# Patient Record
Sex: MALE | Race: WHITE | NOT HISPANIC OR LATINO
[De-identification: names, ages, dates, MRNs, and addresses within clinical notes are randomized per-mention and may not be internally consistent; named-entity substitution may affect disease eponyms.]

---

## 2017-01-12 ENCOUNTER — RECORD ABSTRACTING (OUTPATIENT)
Age: 67
End: 2017-01-12

## 2017-08-13 ENCOUNTER — INPATIENT (INPATIENT)
Facility: HOSPITAL | Age: 67
LOS: 9 days | Discharge: HOME | End: 2017-08-23
Attending: INTERNAL MEDICINE

## 2017-08-13 DIAGNOSIS — R07.9 CHEST PAIN, UNSPECIFIED: ICD-10-CM

## 2017-08-13 DIAGNOSIS — I21.4 NON-ST ELEVATION (NSTEMI) MYOCARDIAL INFARCTION: ICD-10-CM

## 2017-08-25 DIAGNOSIS — N40.0 BENIGN PROSTATIC HYPERPLASIA WITHOUT LOWER URINARY TRACT SYMPTOMS: ICD-10-CM

## 2017-08-25 DIAGNOSIS — N17.9 ACUTE KIDNEY FAILURE, UNSPECIFIED: ICD-10-CM

## 2017-08-25 DIAGNOSIS — I12.9 HYPERTENSIVE CHRONIC KIDNEY DISEASE WITH STAGE 1 THROUGH STAGE 4 CHRONIC KIDNEY DISEASE, OR UNSPECIFIED CHRONIC KIDNEY DISEASE: ICD-10-CM

## 2017-08-25 DIAGNOSIS — I21.4 NON-ST ELEVATION (NSTEMI) MYOCARDIAL INFARCTION: ICD-10-CM

## 2017-08-25 DIAGNOSIS — R50.9 FEVER, UNSPECIFIED: ICD-10-CM

## 2017-08-25 DIAGNOSIS — N18.3 CHRONIC KIDNEY DISEASE, STAGE 3 (MODERATE): ICD-10-CM

## 2017-08-25 DIAGNOSIS — I30.9 ACUTE PERICARDITIS, UNSPECIFIED: ICD-10-CM

## 2017-08-25 DIAGNOSIS — Z79.4 LONG TERM (CURRENT) USE OF INSULIN: ICD-10-CM

## 2017-08-25 DIAGNOSIS — E11.65 TYPE 2 DIABETES MELLITUS WITH HYPERGLYCEMIA: ICD-10-CM

## 2017-08-25 DIAGNOSIS — I21.3 ST ELEVATION (STEMI) MYOCARDIAL INFARCTION OF UNSPECIFIED SITE: ICD-10-CM

## 2017-08-25 DIAGNOSIS — I25.10 ATHEROSCLEROTIC HEART DISEASE OF NATIVE CORONARY ARTERY WITHOUT ANGINA PECTORIS: ICD-10-CM

## 2017-08-25 DIAGNOSIS — E83.42 HYPOMAGNESEMIA: ICD-10-CM

## 2017-08-25 DIAGNOSIS — J90 PLEURAL EFFUSION, NOT ELSEWHERE CLASSIFIED: ICD-10-CM

## 2017-08-25 DIAGNOSIS — E78.00 PURE HYPERCHOLESTEROLEMIA, UNSPECIFIED: ICD-10-CM

## 2017-08-25 DIAGNOSIS — E11.22 TYPE 2 DIABETES MELLITUS WITH DIABETIC CHRONIC KIDNEY DISEASE: ICD-10-CM

## 2017-09-06 ENCOUNTER — APPOINTMENT (OUTPATIENT)
Dept: GERIATRICS | Facility: CLINIC | Age: 67
End: 2017-09-06

## 2017-09-06 ENCOUNTER — OUTPATIENT (OUTPATIENT)
Dept: OUTPATIENT SERVICES | Facility: HOSPITAL | Age: 67
LOS: 1 days | Discharge: HOME | End: 2017-09-06

## 2017-09-06 VITALS
SYSTOLIC BLOOD PRESSURE: 113 MMHG | BODY MASS INDEX: 29.33 KG/M2 | WEIGHT: 198 LBS | DIASTOLIC BLOOD PRESSURE: 75 MMHG | HEIGHT: 69 IN | HEART RATE: 71 BPM

## 2017-09-06 DIAGNOSIS — R07.9 CHEST PAIN, UNSPECIFIED: ICD-10-CM

## 2017-09-06 DIAGNOSIS — I21.4 NON-ST ELEVATION (NSTEMI) MYOCARDIAL INFARCTION: ICD-10-CM

## 2017-09-06 RX ORDER — FAMOTIDINE 20 MG/1
20 TABLET, FILM COATED ORAL
Qty: 60 | Refills: 3 | Status: DISCONTINUED | COMMUNITY
Start: 2017-09-06 | End: 2017-09-06

## 2017-09-07 ENCOUNTER — APPOINTMENT (OUTPATIENT)
Dept: INTERNAL MEDICINE | Facility: CLINIC | Age: 67
End: 2017-09-07

## 2017-09-07 DIAGNOSIS — E11.9 TYPE 2 DIABETES MELLITUS WITHOUT COMPLICATIONS: ICD-10-CM

## 2017-09-07 DIAGNOSIS — I10 ESSENTIAL (PRIMARY) HYPERTENSION: ICD-10-CM

## 2017-09-07 DIAGNOSIS — I21.4 NON-ST ELEVATION (NSTEMI) MYOCARDIAL INFARCTION: ICD-10-CM

## 2017-09-07 DIAGNOSIS — E78.5 HYPERLIPIDEMIA, UNSPECIFIED: ICD-10-CM

## 2017-09-08 ENCOUNTER — APPOINTMENT (OUTPATIENT)
Dept: INTERNAL MEDICINE | Facility: CLINIC | Age: 67
End: 2017-09-08

## 2017-09-20 ENCOUNTER — OUTPATIENT (OUTPATIENT)
Dept: OUTPATIENT SERVICES | Facility: HOSPITAL | Age: 67
LOS: 1 days | Discharge: HOME | End: 2017-09-20

## 2017-09-20 DIAGNOSIS — E78.5 HYPERLIPIDEMIA, UNSPECIFIED: ICD-10-CM

## 2017-09-20 DIAGNOSIS — I21.4 NON-ST ELEVATION (NSTEMI) MYOCARDIAL INFARCTION: ICD-10-CM

## 2017-09-20 DIAGNOSIS — I25.10 ATHEROSCLEROTIC HEART DISEASE OF NATIVE CORONARY ARTERY WITHOUT ANGINA PECTORIS: ICD-10-CM

## 2017-09-20 DIAGNOSIS — I10 ESSENTIAL (PRIMARY) HYPERTENSION: ICD-10-CM

## 2017-09-20 DIAGNOSIS — R07.9 CHEST PAIN, UNSPECIFIED: ICD-10-CM

## 2017-09-25 ENCOUNTER — RX RENEWAL (OUTPATIENT)
Age: 67
End: 2017-09-25

## 2017-10-26 ENCOUNTER — OUTPATIENT (OUTPATIENT)
Dept: OUTPATIENT SERVICES | Facility: HOSPITAL | Age: 67
LOS: 1 days | Discharge: HOME | End: 2017-10-26

## 2017-10-26 DIAGNOSIS — H52.03 HYPERMETROPIA, BILATERAL: ICD-10-CM

## 2017-10-26 DIAGNOSIS — R07.9 CHEST PAIN, UNSPECIFIED: ICD-10-CM

## 2017-10-26 DIAGNOSIS — I21.4 NON-ST ELEVATION (NSTEMI) MYOCARDIAL INFARCTION: ICD-10-CM

## 2017-10-26 DIAGNOSIS — H52.4 PRESBYOPIA: ICD-10-CM

## 2017-10-26 DIAGNOSIS — H52.223 REGULAR ASTIGMATISM, BILATERAL: ICD-10-CM

## 2017-11-08 ENCOUNTER — APPOINTMENT (OUTPATIENT)
Dept: GERIATRICS | Facility: CLINIC | Age: 67
End: 2017-11-08

## 2017-11-08 ENCOUNTER — OUTPATIENT (OUTPATIENT)
Dept: OUTPATIENT SERVICES | Facility: HOSPITAL | Age: 67
LOS: 1 days | Discharge: HOME | End: 2017-11-08

## 2017-11-08 VITALS — DIASTOLIC BLOOD PRESSURE: 92 MMHG | SYSTOLIC BLOOD PRESSURE: 158 MMHG

## 2017-11-08 VITALS
SYSTOLIC BLOOD PRESSURE: 154 MMHG | HEIGHT: 69 IN | BODY MASS INDEX: 46.65 KG/M2 | HEART RATE: 73 BPM | DIASTOLIC BLOOD PRESSURE: 86 MMHG | WEIGHT: 315 LBS

## 2017-11-08 DIAGNOSIS — E78.00 PURE HYPERCHOLESTEROLEMIA, UNSPECIFIED: ICD-10-CM

## 2017-11-08 DIAGNOSIS — Z87.891 PERSONAL HISTORY OF NICOTINE DEPENDENCE: ICD-10-CM

## 2017-11-08 DIAGNOSIS — I21.4 NON-ST ELEVATION (NSTEMI) MYOCARDIAL INFARCTION: ICD-10-CM

## 2017-11-08 DIAGNOSIS — Z78.9 OTHER SPECIFIED HEALTH STATUS: ICD-10-CM

## 2017-11-08 DIAGNOSIS — E11.9 TYPE 2 DIABETES MELLITUS WITHOUT COMPLICATIONS: ICD-10-CM

## 2017-11-08 DIAGNOSIS — I10 ESSENTIAL (PRIMARY) HYPERTENSION: ICD-10-CM

## 2017-11-08 DIAGNOSIS — R07.9 CHEST PAIN, UNSPECIFIED: ICD-10-CM

## 2017-11-08 RX ORDER — CARVEDILOL 25 MG/1
25 TABLET, FILM COATED ORAL TWICE DAILY
Qty: 180 | Refills: 1 | Status: COMPLETED | COMMUNITY
Start: 1900-01-01 | End: 2018-05-07

## 2017-11-08 RX ORDER — LOSARTAN POTASSIUM 50 MG/1
50 TABLET, FILM COATED ORAL DAILY
Qty: 90 | Refills: 3 | Status: DISCONTINUED | COMMUNITY
Start: 2017-09-06 | End: 2017-11-08

## 2017-11-10 ENCOUNTER — RESULT REVIEW (OUTPATIENT)
Age: 67
End: 2017-11-10

## 2017-11-16 LAB
ALBUMIN SERPL-MCNC: 4.2 G/DL
ALBUMIN/GLOB SERPL: 1.35
ALP SERPL-CCNC: 88 IU/L
ALT SERPL-CCNC: 29 IU/L
ANION GAP SERPL CALC-SCNC: 9 MEQ/L
AST SERPL-CCNC: 29 IU/L
BASOPHILS # BLD: 0.07 TH/MM3
BASOPHILS NFR BLD: 0.9 %
BILIRUB SERPL-MCNC: 0.6 MG/DL
BUN SERPL-MCNC: 14 MG/DL
BUN/CREAT SERPL: 8.9 %
CALCIUM SERPL-MCNC: 10 MG/DL
CHLORIDE SERPL-SCNC: 101 MEQ/L
CO2 SERPL-SCNC: 27 MEQ/L
CREAT SERPL-MCNC: 1.57 MG/DL
DIFFERENTIAL METHOD BLD: NORMAL
EOSINOPHIL # BLD: 0.37 TH/MM3
EOSINOPHIL NFR BLD: 4.7 %
ERYTHROCYTE [DISTWIDTH] IN BLOOD BY AUTOMATED COUNT: 14.9 %
ESTIMATED AVERGAGE GLUCOSE (NORTH): 229 MG/DL
GFR SERPL CREATININE-BSD FRML MDRD: 44
GLUCOSE SERPL-MCNC: 140 MG/DL
GRANULOCYTES # BLD: 3.4 TH/MM3
GRANULOCYTES NFR BLD: 43 %
HBA1C MFR BLD: 9.6 %
HCT VFR BLD AUTO: 45.2 %
HGB BLD-MCNC: 14.4 G/DL
IMM GRANULOCYTES # BLD: 0.01 TH/MM3
IMM GRANULOCYTES NFR BLD: 0.1 %
LYMPHOCYTES # BLD: 3.16 TH/MM3
LYMPHOCYTES NFR BLD: 40.1 %
MCH RBC QN AUTO: 25 PG
MCHC RBC AUTO-ENTMCNC: 31.9 G/DL
MCV RBC AUTO: 78.6 FL
MONOCYTES # BLD: 0.88 TH/MM3
MONOCYTES NFR BLD: 11.2 %
PLATELET # BLD: 316 TH/MM3
PMV BLD AUTO: 10.4 FL
POTASSIUM SERPL-SCNC: 4.4 MMOL/L
PROT SERPL-MCNC: 7.3 G/DL
RBC # BLD AUTO: 5.75 MIL/MM3
SODIUM SERPL-SCNC: 137 MEQ/L
URINE COMP/EPITH (NORTH): ABNORMAL
WBC # BLD: 7.89 TH/MM3
WBC URNS QL MICRO: ABNORMAL P/HPF

## 2017-11-29 ENCOUNTER — APPOINTMENT (OUTPATIENT)
Dept: ENDOCRINOLOGY | Facility: CLINIC | Age: 67
End: 2017-11-29

## 2017-11-29 ENCOUNTER — OUTPATIENT (OUTPATIENT)
Dept: OUTPATIENT SERVICES | Facility: HOSPITAL | Age: 67
LOS: 1 days | Discharge: HOME | End: 2017-11-29

## 2017-11-29 VITALS
BODY MASS INDEX: 29.92 KG/M2 | DIASTOLIC BLOOD PRESSURE: 86 MMHG | HEIGHT: 69 IN | SYSTOLIC BLOOD PRESSURE: 142 MMHG | WEIGHT: 202 LBS

## 2017-11-29 DIAGNOSIS — Q80.9 CONGENITAL ICHTHYOSIS, UNSPECIFIED: ICD-10-CM

## 2017-11-29 DIAGNOSIS — R07.9 CHEST PAIN, UNSPECIFIED: ICD-10-CM

## 2017-11-29 DIAGNOSIS — I21.4 NON-ST ELEVATION (NSTEMI) MYOCARDIAL INFARCTION: ICD-10-CM

## 2017-11-29 RX ORDER — INSULIN ASPART 100 [IU]/ML
100 INJECTION, SOLUTION INTRAVENOUS; SUBCUTANEOUS
Qty: 2 | Refills: 2 | Status: DISCONTINUED | COMMUNITY
End: 2017-11-29

## 2017-12-05 DIAGNOSIS — E78.00 PURE HYPERCHOLESTEROLEMIA, UNSPECIFIED: ICD-10-CM

## 2017-12-05 DIAGNOSIS — E11.65 TYPE 2 DIABETES MELLITUS WITH HYPERGLYCEMIA: ICD-10-CM

## 2017-12-05 DIAGNOSIS — E66.01 MORBID (SEVERE) OBESITY DUE TO EXCESS CALORIES: ICD-10-CM

## 2017-12-24 RX ORDER — INSULIN DETEMIR 100 [IU]/ML
100 INJECTION, SOLUTION SUBCUTANEOUS AT BEDTIME
Qty: 4 | Refills: 1 | Status: DISCONTINUED | COMMUNITY
Start: 2017-09-06 | End: 2017-12-24

## 2018-03-14 ENCOUNTER — APPOINTMENT (OUTPATIENT)
Dept: GERIATRICS | Facility: CLINIC | Age: 68
End: 2018-03-14

## 2018-03-23 RX ORDER — INSULIN GLARGINE 300 U/ML
300 INJECTION, SOLUTION SUBCUTANEOUS DAILY
Qty: 2 | Refills: 5 | Status: DISCONTINUED | COMMUNITY
Start: 2017-11-29 | End: 2018-03-23

## 2018-03-29 ENCOUNTER — APPOINTMENT (OUTPATIENT)
Dept: ENDOCRINOLOGY | Facility: CLINIC | Age: 68
End: 2018-03-29

## 2018-07-05 ENCOUNTER — OUTPATIENT (OUTPATIENT)
Dept: OUTPATIENT SERVICES | Facility: HOSPITAL | Age: 68
LOS: 1 days | Discharge: HOME | End: 2018-07-05

## 2018-07-05 ENCOUNTER — APPOINTMENT (OUTPATIENT)
Dept: GERIATRICS | Facility: CLINIC | Age: 68
End: 2018-07-05

## 2018-07-05 ENCOUNTER — LABORATORY RESULT (OUTPATIENT)
Age: 68
End: 2018-07-05

## 2018-07-05 VITALS
BODY MASS INDEX: 29.18 KG/M2 | DIASTOLIC BLOOD PRESSURE: 107 MMHG | HEART RATE: 114 BPM | WEIGHT: 197 LBS | SYSTOLIC BLOOD PRESSURE: 171 MMHG | HEIGHT: 69 IN

## 2018-07-05 RX ORDER — PANTOPRAZOLE 40 MG/1
40 TABLET, DELAYED RELEASE ORAL DAILY
Qty: 90 | Refills: 3 | Status: DISCONTINUED | COMMUNITY
Start: 2017-09-06 | End: 2018-07-05

## 2018-07-06 RX ORDER — HYDRALAZINE HYDROCHLORIDE 25 MG/1
25 TABLET ORAL TWICE DAILY
Qty: 180 | Refills: 1 | Status: COMPLETED | COMMUNITY
Start: 1900-01-01 | End: 2018-07-06

## 2018-07-06 RX ORDER — ATORVASTATIN CALCIUM 20 MG/1
20 TABLET, FILM COATED ORAL DAILY
Qty: 90 | Refills: 2 | Status: DISCONTINUED | COMMUNITY
Start: 2017-09-06 | End: 2018-07-06

## 2018-07-06 RX ORDER — EZETIMIBE 10 MG/1
10 TABLET ORAL
Qty: 90 | Refills: 3 | Status: DISCONTINUED | COMMUNITY
Start: 2017-11-29 | End: 2018-07-06

## 2018-07-09 DIAGNOSIS — E11.9 TYPE 2 DIABETES MELLITUS WITHOUT COMPLICATIONS: ICD-10-CM

## 2018-07-09 DIAGNOSIS — I21.4 NON-ST ELEVATION (NSTEMI) MYOCARDIAL INFARCTION: ICD-10-CM

## 2018-07-09 DIAGNOSIS — E66.01 MORBID (SEVERE) OBESITY DUE TO EXCESS CALORIES: ICD-10-CM

## 2018-07-09 DIAGNOSIS — E78.2 MIXED HYPERLIPIDEMIA: ICD-10-CM

## 2018-07-19 ENCOUNTER — OUTPATIENT (OUTPATIENT)
Dept: OUTPATIENT SERVICES | Facility: HOSPITAL | Age: 68
LOS: 1 days | Discharge: HOME | End: 2018-07-19

## 2018-07-19 ENCOUNTER — APPOINTMENT (OUTPATIENT)
Dept: GERIATRICS | Facility: CLINIC | Age: 68
End: 2018-07-19

## 2018-07-19 ENCOUNTER — APPOINTMENT (OUTPATIENT)
Dept: ENDOCRINOLOGY | Facility: CLINIC | Age: 68
End: 2018-07-19

## 2018-07-19 VITALS
WEIGHT: 198 LBS | BODY MASS INDEX: 29.33 KG/M2 | HEART RATE: 84 BPM | HEIGHT: 69 IN | SYSTOLIC BLOOD PRESSURE: 158 MMHG | DIASTOLIC BLOOD PRESSURE: 89 MMHG

## 2018-07-19 VITALS
HEART RATE: 93 BPM | SYSTOLIC BLOOD PRESSURE: 139 MMHG | HEIGHT: 69 IN | DIASTOLIC BLOOD PRESSURE: 82 MMHG | BODY MASS INDEX: 28.88 KG/M2 | WEIGHT: 195 LBS

## 2018-07-19 DIAGNOSIS — N18.3 CHRONIC KIDNEY DISEASE, STAGE 3 (MODERATE): ICD-10-CM

## 2018-07-19 DIAGNOSIS — E11.9 TYPE 2 DIABETES MELLITUS WITHOUT COMPLICATIONS: ICD-10-CM

## 2018-07-19 DIAGNOSIS — E78.2 MIXED HYPERLIPIDEMIA: ICD-10-CM

## 2018-07-19 DIAGNOSIS — E66.01 MORBID (SEVERE) OBESITY DUE TO EXCESS CALORIES: ICD-10-CM

## 2018-07-19 LAB
ALBUMIN SERPL ELPH-MCNC: 4.5 G/DL
ALP BLD-CCNC: 92 U/L
ALT SERPL-CCNC: 31 U/L
ANION GAP SERPL CALC-SCNC: 18 MMOL/L
AST SERPL-CCNC: 26 U/L
BASOPHILS # BLD AUTO: 0.04 K/UL
BASOPHILS NFR BLD AUTO: 0.4 %
BILIRUB SERPL-MCNC: 0.2 MG/DL
BUN SERPL-MCNC: 22 MG/DL
CALCIUM SERPL-MCNC: 10.1 MG/DL
CHLORIDE SERPL-SCNC: 98 MMOL/L
CHOLEST SERPL-MCNC: 345 MG/DL
CHOLEST/HDLC SERPL: 7.5 RATIO
CO2 SERPL-SCNC: 23 MMOL/L
CREAT SERPL-MCNC: 1.6 MG/DL
EOSINOPHIL # BLD AUTO: 0.13 K/UL
EOSINOPHIL NFR BLD AUTO: 1.3 %
GLUCOSE SERPL-MCNC: 114 MG/DL
HCT VFR BLD CALC: 49.4 %
HDLC SERPL-MCNC: 46 MG/DL
HGB BLD-MCNC: 15.8 G/DL
IMM GRANULOCYTES NFR BLD AUTO: 0.4 %
LDLC SERPL CALC-MCNC: 235 MG/DL
LYMPHOCYTES # BLD AUTO: 2.49 K/UL
LYMPHOCYTES NFR BLD AUTO: 24.6 %
MAN DIFF?: NORMAL
MCHC RBC-ENTMCNC: 26.6 PG
MCHC RBC-ENTMCNC: 32 G/DL
MCV RBC AUTO: 83 FL
MONOCYTES # BLD AUTO: 0.79 K/UL
MONOCYTES NFR BLD AUTO: 7.8 %
NEUTROPHILS # BLD AUTO: 6.62 K/UL
NEUTROPHILS NFR BLD AUTO: 65.5 %
PLATELET # BLD AUTO: 309 K/UL
POTASSIUM SERPL-SCNC: 4.4 MMOL/L
PROT SERPL-MCNC: 8.1 G/DL
RBC # BLD: 5.95 M/UL
RBC # FLD: 14.9 %
SODIUM SERPL-SCNC: 139 MMOL/L
TRIGL SERPL-MCNC: 650 MG/DL
WBC # FLD AUTO: 10.11 K/UL

## 2018-07-19 RX ORDER — ESOMEPRAZOLE MAGNESIUM 40 MG/1
40 CAPSULE, DELAYED RELEASE ORAL
Qty: 30 | Refills: 5 | Status: DISCONTINUED | COMMUNITY
Start: 2018-07-05 | End: 2018-07-19

## 2018-07-19 RX ORDER — LIRAGLUTIDE 6 MG/ML
18 INJECTION SUBCUTANEOUS DAILY
Qty: 1 | Refills: 5 | Status: COMPLETED | COMMUNITY
Start: 2017-11-29 | End: 2018-07-19

## 2018-07-19 RX ORDER — OMEGA-3/DHA/EPA/FISH OIL 1000 MG
1000 CAPSULE ORAL DAILY
Qty: 90 | Refills: 3 | Status: DISCONTINUED | COMMUNITY
Start: 2017-09-06 | End: 2018-07-19

## 2018-07-25 ENCOUNTER — OTHER (OUTPATIENT)
Age: 68
End: 2018-07-25

## 2018-07-30 ENCOUNTER — APPOINTMENT (OUTPATIENT)
Dept: CARDIOLOGY | Facility: CLINIC | Age: 68
End: 2018-07-30

## 2018-08-01 ENCOUNTER — APPOINTMENT (OUTPATIENT)
Dept: ENDOCRINOLOGY | Facility: CLINIC | Age: 68
End: 2018-08-01

## 2018-08-01 ENCOUNTER — APPOINTMENT (OUTPATIENT)
Dept: GERIATRICS | Facility: CLINIC | Age: 68
End: 2018-08-01

## 2018-08-17 ENCOUNTER — APPOINTMENT (OUTPATIENT)
Dept: GASTROENTEROLOGY | Facility: CLINIC | Age: 68
End: 2018-08-17

## 2018-08-17 ENCOUNTER — OUTPATIENT (OUTPATIENT)
Dept: OUTPATIENT SERVICES | Facility: HOSPITAL | Age: 68
LOS: 1 days | Discharge: HOME | End: 2018-08-17

## 2018-08-17 VITALS — HEART RATE: 82 BPM | SYSTOLIC BLOOD PRESSURE: 125 MMHG | DIASTOLIC BLOOD PRESSURE: 81 MMHG

## 2018-08-17 DIAGNOSIS — Z86.010 PERSONAL HISTORY OF COLONIC POLYPS: ICD-10-CM

## 2018-08-17 DIAGNOSIS — K59.00 CONSTIPATION, UNSPECIFIED: ICD-10-CM

## 2018-08-23 ENCOUNTER — OUTPATIENT (OUTPATIENT)
Dept: OUTPATIENT SERVICES | Facility: HOSPITAL | Age: 68
LOS: 1 days | Discharge: HOME | End: 2018-08-23

## 2018-08-24 DIAGNOSIS — H52.223 REGULAR ASTIGMATISM, BILATERAL: ICD-10-CM

## 2018-08-24 DIAGNOSIS — H04.123 DRY EYE SYNDROME OF BILATERAL LACRIMAL GLANDS: ICD-10-CM

## 2018-08-24 DIAGNOSIS — H18.9 UNSPECIFIED DISORDER OF CORNEA: ICD-10-CM

## 2018-08-24 DIAGNOSIS — H52.03 HYPERMETROPIA, BILATERAL: ICD-10-CM

## 2018-08-24 DIAGNOSIS — H52.4 PRESBYOPIA: ICD-10-CM

## 2018-09-21 ENCOUNTER — LABORATORY RESULT (OUTPATIENT)
Age: 68
End: 2018-09-21

## 2018-09-22 LAB — TSH SERPL-ACNC: 2.4 UIU/ML

## 2018-09-27 LAB
25(OH)D3 SERPL-MCNC: 30 NG/ML
ALBUMIN SERPL ELPH-MCNC: 4.4 G/DL
ALP BLD-CCNC: 81 U/L
ALT SERPL-CCNC: 20 U/L
ANION GAP SERPL CALC-SCNC: 16 MMOL/L
AST SERPL-CCNC: 23 U/L
BASOPHILS # BLD AUTO: 0.06 K/UL
BASOPHILS NFR BLD AUTO: 0.8 %
BILIRUB SERPL-MCNC: <0.2 MG/DL
BUN SERPL-MCNC: 16 MG/DL
CALCIUM SERPL-MCNC: 9.7 MG/DL
CHLORIDE SERPL-SCNC: 101 MMOL/L
CHOLEST SERPL-MCNC: 152 MG/DL
CHOLEST/HDLC SERPL: 3.1 RATIO
CO2 SERPL-SCNC: 22 MMOL/L
CREAT SERPL-MCNC: 1.5 MG/DL
CREAT SPEC-SCNC: 55 MG/DL
EOSINOPHIL # BLD AUTO: 0.24 K/UL
EOSINOPHIL NFR BLD AUTO: 3.1 %
GLUCOSE SERPL-MCNC: 148 MG/DL
HCT VFR BLD CALC: 42.6 %
HDLC SERPL-MCNC: 49 MG/DL
HGB BLD-MCNC: 13.9 G/DL
IMM GRANULOCYTES NFR BLD AUTO: 0.3 %
LDLC SERPL CALC-MCNC: 75 MG/DL
LYMPHOCYTES # BLD AUTO: 2.99 K/UL
LYMPHOCYTES NFR BLD AUTO: 38.9 %
MAN DIFF?: NORMAL
MCHC RBC-ENTMCNC: 26.8 PG
MCHC RBC-ENTMCNC: 32.6 G/DL
MCV RBC AUTO: 82.1 FL
MICROALBUMIN 24H UR DL<=1MG/L-MCNC: 10 MG/DL
MICROALBUMIN/CREAT 24H UR-RTO: 182 MG/G
MONOCYTES # BLD AUTO: 0.62 K/UL
MONOCYTES NFR BLD AUTO: 8.1 %
NEUTROPHILS # BLD AUTO: 3.75 K/UL
NEUTROPHILS NFR BLD AUTO: 48.8 %
PLATELET # BLD AUTO: 314 K/UL
POTASSIUM SERPL-SCNC: 5.4 MMOL/L
PROT SERPL-MCNC: 7.9 G/DL
RBC # BLD: 5.19 M/UL
RBC # FLD: 14.6 %
SODIUM SERPL-SCNC: 139 MMOL/L
TRIGL SERPL-MCNC: 494 MG/DL
TSH SERPL-ACNC: 2.45 UIU/ML
WBC # FLD AUTO: 7.68 K/UL

## 2018-10-01 ENCOUNTER — APPOINTMENT (OUTPATIENT)
Dept: PODIATRY | Facility: CLINIC | Age: 68
End: 2018-10-01
Payer: MEDICAID

## 2018-10-01 ENCOUNTER — OUTPATIENT (OUTPATIENT)
Dept: OUTPATIENT SERVICES | Facility: HOSPITAL | Age: 68
LOS: 1 days | Discharge: HOME | End: 2018-10-01

## 2018-10-01 VITALS
HEIGHT: 69 IN | BODY MASS INDEX: 28.73 KG/M2 | WEIGHT: 194 LBS | HEART RATE: 81 BPM | RESPIRATION RATE: 16 BRPM | DIASTOLIC BLOOD PRESSURE: 83 MMHG | SYSTOLIC BLOOD PRESSURE: 135 MMHG

## 2018-10-01 DIAGNOSIS — M20.31 HALLUX VARUS (ACQUIRED), RIGHT FOOT: ICD-10-CM

## 2018-10-01 DIAGNOSIS — Q66.7 CONGENITAL PES CAVUS: ICD-10-CM

## 2018-10-01 DIAGNOSIS — B35.1 TINEA UNGUIUM: ICD-10-CM

## 2018-10-01 PROCEDURE — 99203 OFFICE O/P NEW LOW 30 MIN: CPT

## 2018-10-09 DIAGNOSIS — B35.1 TINEA UNGUIUM: ICD-10-CM

## 2018-10-09 DIAGNOSIS — Q66.7 CONGENITAL PES CAVUS: ICD-10-CM

## 2018-10-09 DIAGNOSIS — M20.31 HALLUX VARUS (ACQUIRED), RIGHT FOOT: ICD-10-CM

## 2018-10-09 DIAGNOSIS — E11.9 TYPE 2 DIABETES MELLITUS WITHOUT COMPLICATIONS: ICD-10-CM

## 2018-10-18 ENCOUNTER — APPOINTMENT (OUTPATIENT)
Dept: GERIATRICS | Facility: CLINIC | Age: 68
End: 2018-10-18

## 2018-10-18 ENCOUNTER — APPOINTMENT (OUTPATIENT)
Dept: ENDOCRINOLOGY | Facility: CLINIC | Age: 68
End: 2018-10-18

## 2018-10-18 ENCOUNTER — OUTPATIENT (OUTPATIENT)
Dept: OUTPATIENT SERVICES | Facility: HOSPITAL | Age: 68
LOS: 1 days | Discharge: HOME | End: 2018-10-18

## 2018-10-18 VITALS
HEIGHT: 69 IN | BODY MASS INDEX: 28.58 KG/M2 | WEIGHT: 193 LBS | HEART RATE: 80 BPM | SYSTOLIC BLOOD PRESSURE: 145 MMHG | DIASTOLIC BLOOD PRESSURE: 76 MMHG

## 2018-10-18 VITALS
DIASTOLIC BLOOD PRESSURE: 81 MMHG | BODY MASS INDEX: 28.58 KG/M2 | HEART RATE: 63 BPM | SYSTOLIC BLOOD PRESSURE: 149 MMHG | HEIGHT: 69 IN | WEIGHT: 193 LBS

## 2018-10-18 DIAGNOSIS — E10.65 TYPE 1 DIABETES MELLITUS WITH HYPERGLYCEMIA: ICD-10-CM

## 2018-10-19 ENCOUNTER — RX CHANGE (OUTPATIENT)
Age: 68
End: 2018-10-19

## 2018-10-19 ENCOUNTER — OTHER (OUTPATIENT)
Age: 68
End: 2018-10-19

## 2018-10-19 DIAGNOSIS — B35.1 TINEA UNGUIUM: ICD-10-CM

## 2018-10-19 DIAGNOSIS — I25.10 ATHEROSCLEROTIC HEART DISEASE OF NATIVE CORONARY ARTERY WITHOUT ANGINA PECTORIS: ICD-10-CM

## 2018-10-19 DIAGNOSIS — K21.9 GASTRO-ESOPHAGEAL REFLUX DISEASE WITHOUT ESOPHAGITIS: ICD-10-CM

## 2018-10-19 DIAGNOSIS — I10 ESSENTIAL (PRIMARY) HYPERTENSION: ICD-10-CM

## 2018-10-19 PROBLEM — E10.65 CONTROLLED TYPE 1 DIABETES MELLITUS WITH HYPERGLYCEMIA, WITH LONG-TERM CURRENT USE OF INSULIN: Status: RESOLVED | Noted: 2018-07-20 | Resolved: 2018-10-19

## 2018-10-22 RX ORDER — LISINOPRIL 10 MG/1
10 TABLET ORAL DAILY
Qty: 30 | Refills: 5 | Status: DISCONTINUED | COMMUNITY
Start: 2018-07-19 | End: 2018-10-22

## 2018-10-22 RX ORDER — ROSUVASTATIN CALCIUM 20 MG/1
20 TABLET, FILM COATED ORAL DAILY
Qty: 90 | Refills: 1 | Status: DISCONTINUED | COMMUNITY
Start: 2018-07-06 | End: 2018-10-22

## 2018-10-29 DIAGNOSIS — E66.01 MORBID (SEVERE) OBESITY DUE TO EXCESS CALORIES: ICD-10-CM

## 2018-10-29 DIAGNOSIS — E78.00 PURE HYPERCHOLESTEROLEMIA, UNSPECIFIED: ICD-10-CM

## 2018-10-29 DIAGNOSIS — E11.65 TYPE 2 DIABETES MELLITUS WITH HYPERGLYCEMIA: ICD-10-CM

## 2018-11-01 ENCOUNTER — OUTPATIENT (OUTPATIENT)
Dept: OUTPATIENT SERVICES | Facility: HOSPITAL | Age: 68
LOS: 1 days | End: 2018-11-01
Payer: MEDICAID

## 2018-11-01 PROCEDURE — G9001: CPT

## 2018-11-13 ENCOUNTER — RX RENEWAL (OUTPATIENT)
Age: 68
End: 2018-11-13

## 2018-11-20 ENCOUNTER — INPATIENT (INPATIENT)
Facility: HOSPITAL | Age: 68
LOS: 0 days | Discharge: HOME | End: 2018-11-21
Attending: INTERNAL MEDICINE | Admitting: INTERNAL MEDICINE

## 2018-11-20 VITALS
RESPIRATION RATE: 18 BRPM | DIASTOLIC BLOOD PRESSURE: 63 MMHG | OXYGEN SATURATION: 97 % | HEART RATE: 90 BPM | SYSTOLIC BLOOD PRESSURE: 135 MMHG | TEMPERATURE: 97 F

## 2018-11-20 DIAGNOSIS — Z98.890 OTHER SPECIFIED POSTPROCEDURAL STATES: Chronic | ICD-10-CM

## 2018-11-20 LAB
ALBUMIN SERPL ELPH-MCNC: 4.5 G/DL — SIGNIFICANT CHANGE UP (ref 3.5–5.2)
ALP SERPL-CCNC: 85 U/L — SIGNIFICANT CHANGE UP (ref 30–115)
ALT FLD-CCNC: 21 U/L — SIGNIFICANT CHANGE UP (ref 0–41)
ANION GAP SERPL CALC-SCNC: 14 MMOL/L — SIGNIFICANT CHANGE UP (ref 7–14)
ANION GAP SERPL CALC-SCNC: 15 MMOL/L — HIGH (ref 7–14)
AST SERPL-CCNC: 29 U/L — SIGNIFICANT CHANGE UP (ref 0–41)
BASE EXCESS BLDV CALC-SCNC: 1.9 MMOL/L — SIGNIFICANT CHANGE UP (ref -2–2)
BASOPHILS # BLD AUTO: 0.06 K/UL — SIGNIFICANT CHANGE UP (ref 0–0.2)
BASOPHILS NFR BLD AUTO: 0.7 % — SIGNIFICANT CHANGE UP (ref 0–1)
BILIRUB SERPL-MCNC: <0.2 MG/DL — SIGNIFICANT CHANGE UP (ref 0.2–1.2)
BUN SERPL-MCNC: 22 MG/DL — HIGH (ref 10–20)
BUN SERPL-MCNC: 26 MG/DL — HIGH (ref 10–20)
CA-I SERPL-SCNC: 1.28 MMOL/L — SIGNIFICANT CHANGE UP (ref 1.12–1.3)
CALCIUM SERPL-MCNC: 9.4 MG/DL — SIGNIFICANT CHANGE UP (ref 8.5–10.1)
CALCIUM SERPL-MCNC: 9.7 MG/DL — SIGNIFICANT CHANGE UP (ref 8.5–10.1)
CHLORIDE SERPL-SCNC: 98 MMOL/L — SIGNIFICANT CHANGE UP (ref 98–110)
CHLORIDE SERPL-SCNC: 98 MMOL/L — SIGNIFICANT CHANGE UP (ref 98–110)
CK MB CFR SERPL CALC: 2.8 NG/ML — SIGNIFICANT CHANGE UP (ref 0.6–6.3)
CK SERPL-CCNC: 159 U/L — SIGNIFICANT CHANGE UP (ref 0–225)
CO2 SERPL-SCNC: 24 MMOL/L — SIGNIFICANT CHANGE UP (ref 17–32)
CO2 SERPL-SCNC: 26 MMOL/L — SIGNIFICANT CHANGE UP (ref 17–32)
CREAT SERPL-MCNC: 1.6 MG/DL — HIGH (ref 0.7–1.5)
CREAT SERPL-MCNC: 1.8 MG/DL — HIGH (ref 0.7–1.5)
EOSINOPHIL # BLD AUTO: 0.19 K/UL — SIGNIFICANT CHANGE UP (ref 0–0.7)
EOSINOPHIL NFR BLD AUTO: 2.3 % — SIGNIFICANT CHANGE UP (ref 0–8)
GAS PNL BLDV: 140 MMOL/L — SIGNIFICANT CHANGE UP (ref 136–145)
GAS PNL BLDV: SIGNIFICANT CHANGE UP
GLUCOSE BLDC GLUCOMTR-MCNC: 132 MG/DL — HIGH (ref 70–99)
GLUCOSE SERPL-MCNC: 180 MG/DL — HIGH (ref 70–99)
GLUCOSE SERPL-MCNC: 183 MG/DL — HIGH (ref 70–99)
HCO3 BLDV-SCNC: 29 MMOL/L — SIGNIFICANT CHANGE UP (ref 22–29)
HCT VFR BLD CALC: 42.6 % — SIGNIFICANT CHANGE UP (ref 42–52)
HCT VFR BLDA CALC: 47.9 % — HIGH (ref 34–44)
HGB BLD CALC-MCNC: 15.6 G/DL — SIGNIFICANT CHANGE UP (ref 14–18)
HGB BLD-MCNC: 13.8 G/DL — LOW (ref 14–18)
IMM GRANULOCYTES NFR BLD AUTO: 0.2 % — SIGNIFICANT CHANGE UP (ref 0.1–0.3)
LACTATE BLDV-MCNC: 1.3 MMOL/L — SIGNIFICANT CHANGE UP (ref 0.5–1.6)
LIDOCAIN IGE QN: 52 U/L — SIGNIFICANT CHANGE UP (ref 7–60)
LYMPHOCYTES # BLD AUTO: 2.38 K/UL — SIGNIFICANT CHANGE UP (ref 1.2–3.4)
LYMPHOCYTES # BLD AUTO: 28.5 % — SIGNIFICANT CHANGE UP (ref 20.5–51.1)
MCHC RBC-ENTMCNC: 26.1 PG — LOW (ref 27–31)
MCHC RBC-ENTMCNC: 32.4 G/DL — SIGNIFICANT CHANGE UP (ref 32–37)
MCV RBC AUTO: 80.5 FL — SIGNIFICANT CHANGE UP (ref 80–94)
MONOCYTES # BLD AUTO: 0.62 K/UL — HIGH (ref 0.1–0.6)
MONOCYTES NFR BLD AUTO: 7.4 % — SIGNIFICANT CHANGE UP (ref 1.7–9.3)
NEUTROPHILS # BLD AUTO: 5.07 K/UL — SIGNIFICANT CHANGE UP (ref 1.4–6.5)
NEUTROPHILS NFR BLD AUTO: 60.9 % — SIGNIFICANT CHANGE UP (ref 42.2–75.2)
NRBC # BLD: 0 /100 WBCS — SIGNIFICANT CHANGE UP (ref 0–0)
PCO2 BLDV: 51 MMHG — SIGNIFICANT CHANGE UP (ref 41–51)
PH BLDV: 7.35 — SIGNIFICANT CHANGE UP (ref 7.26–7.43)
PLATELET # BLD AUTO: 311 K/UL — SIGNIFICANT CHANGE UP (ref 130–400)
PO2 BLDV: 19 MMHG — LOW (ref 20–40)
POTASSIUM BLDV-SCNC: 4.9 MMOL/L — SIGNIFICANT CHANGE UP (ref 3.3–5.6)
POTASSIUM SERPL-MCNC: 4.9 MMOL/L — SIGNIFICANT CHANGE UP (ref 3.5–5)
POTASSIUM SERPL-MCNC: 5.6 MMOL/L — HIGH (ref 3.5–5)
POTASSIUM SERPL-SCNC: 4.9 MMOL/L — SIGNIFICANT CHANGE UP (ref 3.5–5)
POTASSIUM SERPL-SCNC: 5.6 MMOL/L — HIGH (ref 3.5–5)
PROT SERPL-MCNC: 7.9 G/DL — SIGNIFICANT CHANGE UP (ref 6–8)
RBC # BLD: 5.29 M/UL — SIGNIFICANT CHANGE UP (ref 4.7–6.1)
RBC # FLD: 14.6 % — HIGH (ref 11.5–14.5)
SAO2 % BLDV: 30 % — SIGNIFICANT CHANGE UP
SODIUM SERPL-SCNC: 137 MMOL/L — SIGNIFICANT CHANGE UP (ref 135–146)
SODIUM SERPL-SCNC: 138 MMOL/L — SIGNIFICANT CHANGE UP (ref 135–146)
TROPONIN T SERPL-MCNC: <0.01 NG/ML — SIGNIFICANT CHANGE UP
TROPONIN T SERPL-MCNC: <0.01 NG/ML — SIGNIFICANT CHANGE UP
WBC # BLD: 8.34 K/UL — SIGNIFICANT CHANGE UP (ref 4.8–10.8)
WBC # FLD AUTO: 8.34 K/UL — SIGNIFICANT CHANGE UP (ref 4.8–10.8)

## 2018-11-20 RX ORDER — TAMSULOSIN HYDROCHLORIDE 0.4 MG/1
0.4 CAPSULE ORAL AT BEDTIME
Qty: 0 | Refills: 0 | Status: DISCONTINUED | OUTPATIENT
Start: 2018-11-20 | End: 2018-11-21

## 2018-11-20 RX ORDER — SIMVASTATIN 20 MG/1
10 TABLET, FILM COATED ORAL AT BEDTIME
Qty: 0 | Refills: 0 | Status: DISCONTINUED | OUTPATIENT
Start: 2018-11-20 | End: 2018-11-21

## 2018-11-20 RX ORDER — CLOPIDOGREL BISULFATE 75 MG/1
75 TABLET, FILM COATED ORAL DAILY
Qty: 0 | Refills: 0 | Status: DISCONTINUED | OUTPATIENT
Start: 2018-11-20 | End: 2018-11-21

## 2018-11-20 RX ORDER — KETOROLAC TROMETHAMINE 30 MG/ML
15 SYRINGE (ML) INJECTION ONCE
Qty: 0 | Refills: 0 | Status: DISCONTINUED | OUTPATIENT
Start: 2018-11-20 | End: 2018-11-20

## 2018-11-20 RX ORDER — LIDOCAINE 4 G/100G
1 CREAM TOPICAL THREE TIMES A DAY
Qty: 0 | Refills: 0 | Status: DISCONTINUED | OUTPATIENT
Start: 2018-11-20 | End: 2018-11-21

## 2018-11-20 RX ORDER — HYDROCHLOROTHIAZIDE 25 MG
12.5 TABLET ORAL DAILY
Qty: 0 | Refills: 0 | Status: DISCONTINUED | OUTPATIENT
Start: 2018-11-20 | End: 2018-11-21

## 2018-11-20 RX ORDER — HEPARIN SODIUM 5000 [USP'U]/ML
5000 INJECTION INTRAVENOUS; SUBCUTANEOUS EVERY 8 HOURS
Qty: 0 | Refills: 0 | Status: DISCONTINUED | OUTPATIENT
Start: 2018-11-20 | End: 2018-11-21

## 2018-11-20 RX ORDER — ASPIRIN/CALCIUM CARB/MAGNESIUM 324 MG
81 TABLET ORAL DAILY
Qty: 0 | Refills: 0 | Status: DISCONTINUED | OUTPATIENT
Start: 2018-11-20 | End: 2018-11-21

## 2018-11-20 RX ORDER — PANTOPRAZOLE SODIUM 20 MG/1
40 TABLET, DELAYED RELEASE ORAL
Qty: 0 | Refills: 0 | Status: DISCONTINUED | OUTPATIENT
Start: 2018-11-20 | End: 2018-11-21

## 2018-11-20 RX ORDER — AMLODIPINE BESYLATE 2.5 MG/1
10 TABLET ORAL DAILY
Qty: 0 | Refills: 0 | Status: DISCONTINUED | OUTPATIENT
Start: 2018-11-20 | End: 2018-11-21

## 2018-11-20 RX ORDER — METOPROLOL TARTRATE 50 MG
25 TABLET ORAL DAILY
Qty: 0 | Refills: 0 | Status: DISCONTINUED | OUTPATIENT
Start: 2018-11-20 | End: 2018-11-21

## 2018-11-20 RX ORDER — PIOGLITAZONE HYDROCHLORIDE 15 MG/1
15 TABLET ORAL DAILY
Qty: 0 | Refills: 0 | Status: DISCONTINUED | OUTPATIENT
Start: 2018-11-20 | End: 2018-11-21

## 2018-11-20 RX ORDER — ASPIRIN/CALCIUM CARB/MAGNESIUM 324 MG
81 TABLET ORAL DAILY
Qty: 0 | Refills: 0 | Status: DISCONTINUED | OUTPATIENT
Start: 2018-11-20 | End: 2018-11-20

## 2018-11-20 RX ORDER — INSULIN GLARGINE 100 [IU]/ML
20 INJECTION, SOLUTION SUBCUTANEOUS AT BEDTIME
Qty: 0 | Refills: 0 | Status: DISCONTINUED | OUTPATIENT
Start: 2018-11-20 | End: 2018-11-21

## 2018-11-20 RX ORDER — LISINOPRIL 2.5 MG/1
10 TABLET ORAL DAILY
Qty: 0 | Refills: 0 | Status: DISCONTINUED | OUTPATIENT
Start: 2018-11-20 | End: 2018-11-21

## 2018-11-20 RX ADMIN — TAMSULOSIN HYDROCHLORIDE 0.4 MILLIGRAM(S): 0.4 CAPSULE ORAL at 21:59

## 2018-11-20 RX ADMIN — SIMVASTATIN 10 MILLIGRAM(S): 20 TABLET, FILM COATED ORAL at 21:59

## 2018-11-20 RX ADMIN — Medication 15 MILLIGRAM(S): at 14:47

## 2018-11-20 RX ADMIN — INSULIN GLARGINE 20 UNIT(S): 100 INJECTION, SOLUTION SUBCUTANEOUS at 21:57

## 2018-11-20 RX ADMIN — LIDOCAINE 1 APPLICATION(S): 4 CREAM TOPICAL at 21:54

## 2018-11-20 RX ADMIN — Medication 81 MILLIGRAM(S): at 14:47

## 2018-11-20 RX ADMIN — HEPARIN SODIUM 5000 UNIT(S): 5000 INJECTION INTRAVENOUS; SUBCUTANEOUS at 21:52

## 2018-11-20 NOTE — ED PROVIDER NOTE - MUSCULOSKELETAL, MLM
no obvious deformity to right foot + tenderness along dorso-lateral aspect of right foot with full ROM exhibited

## 2018-11-20 NOTE — ED PROVIDER NOTE - OBJECTIVE STATEMENT
67 y/o M, PMHx CAD - s/p stent placement (last was one year ago), CKD III, DM, GERD, BPH and CHF, presents to the ED with complaints of chest pain x two days. Patient admits to a burning sensation along his left chest that began while at rest yesterday, lasted three hours and has since subsided. He denies radiating pain, dyspnea, nausea, vomiting, back pain, recent travel and leg swelling. He is not a smoker. He additionally admits to having been with right foot pain since this AM- denies any inciting trauma/injury, skin color changes/rash, fever and chills.

## 2018-11-20 NOTE — H&P ADULT - HISTORY OF PRESENT ILLNESS
67 y/o M with PMHx CAD s/ PCI x1 (1 yr ago with Dr Shell), CKD 3A, DMII, CHF, GERD, HTN, DLD, and gout presenting for 2 episodes of chest pain and R foot pain. Yesterday while at rest, the pt experienced left sided burning chest pain, about 8/9-10 in severity, not changed with position, radiation to the back but not the neck or the arm, improved with drinking some wilfredo tea. The following morning he had a recurrence of the same chest pain, about 2-3/10, which has lasted since the pt has been admitted. Pt states that this feels similar to his gas/reflux pain and feels it would improve if he was given something to eat. The reason he came to the ER however is bc the night ptp he experienced severe pain at the site of the R fifth metatarsal. He said it was 10/10, so bad he was unable to ambulate, and describes it as almost gout like but without the expected redness and joint swelling. He has had gout in the past, but it was located at he first great toe. He states that he had this pain once about a month ago, but denies recollecting any trauma/fall preceding it.

## 2018-11-20 NOTE — H&P ADULT - FAMILY HISTORY
Father  Still living? Unknown  Family history of hypertension, Age at diagnosis: Age Unknown  Family history of heart disease, Age at diagnosis: Age Unknown     Mother  Still living? Unknown  Family history of hypertension, Age at diagnosis: Age Unknown     Sibling  Still living? Unknown  Family history of diabetes mellitus, Age at diagnosis: Age Unknown

## 2018-11-20 NOTE — H&P ADULT - NSHPPHYSICALEXAM_GEN_ALL_CORE
General: middle aged man sitting comfortably up in chair, in NAD  Cardiac: RRR, S1S2, chest pain is not reproducible  Lungs: CTAB  Abd: NTBD, +BS  LE: no swelling, no diminished strength in R foot, +pain on palpation at the site of the R 5th MTP, no significant swelling/redness/warmth at site appreciated  -nail clubbing  Neuro: AAOx3, nonfocal

## 2018-11-20 NOTE — ED PROVIDER NOTE - ATTENDING CONTRIBUTION TO CARE
67 y/o M, PMHx CAD - s/p stent placement (last was one year ago), CKD III, DM, GERD, BPH and CHF, presents for eval of 2 complaints. pt c/o right foot pain, and left sided chest pain X 2 days. no fever, chills. agree with documented exam. impression, chest pain low suspicion, right foot pain, no infection on exam, possible gout. admit to tele for further eval. xrays foot and cxray wnl        presents to the ED with complaints of chest pain x two days. Patient admits to a burning sensation along his left chest that began while at rest yesterday, lasted three hours and has since subsided. He denies radiating pain, dyspnea, nausea, vomiting, back pain, recent travel and leg swelling. He is not a smoker. He additionally admits to having been with right foot pain since this AM- denies any inciting trauma/injury, skin color changes/rash, fever and chills.

## 2018-11-20 NOTE — H&P ADULT - PMH
CAD (coronary artery disease)    CHF (congestive heart failure)    CKD (chronic kidney disease), stage III    Diabetes    Dyslipidemia    GERD (gastroesophageal reflux disease)    Gout    HTN (hypertension)

## 2018-11-20 NOTE — H&P ADULT - ATTENDING COMMENTS
Patient is seen and examined independently at bedside. I agree with the resident's note, history and plan as above. Pt states that the pain in chest is different from angina pain he experiences prior to getting stent. Otherwise, pt confirmed resolution of pain upon taking wilfredo tea. Pt denies chest pain, pressure, SOB, palpitations on exertion.    T(F): 97.5 (11-20-18 @ 16:25), Max: 97.5 (11-20-18 @ 16:25)  HR: 68 (11-20-18 @ 16:25) (68 - 90)  BP: 133/62 (11-20-18 @ 16:25) (133/62 - 135/63)  RR: 18 (11-20-18 @ 16:25) (18 - 18)  SpO2: 97% (11-20-18 @ 16:25) (97% - 97%)    PHYSICAL EXAM:  CONSTITUTIONAL: NAD, well-groomed, well-developed.  HEAD:  Atraumatic, Normocephalic.  EYES: EOMI, PERRLA, conjunctiva and sclera clear.  ENMT: Moist mucous membranes, No lesions.  NERVOUS SYSTEM:  Alert & Oriented X3, Good concentration; No limb weakness or numbness.  RESPIRATORY: Clear to ascultation bilaterally; No rales, rhonchi, wheezing, or rubs.  CARDIOVASCULAR: Regular rate and rhythm; No murmurs, rubs, or gallops.  GASTROINTESTINAL: Soft, Nontender, Nondistended; Bowel sounds present.  MUSCULOSKELETAL: No joint swelling or tenderness. No neck or back pain. Tenderness on dorsal right 5th metatarsal otherwise, no tenderness elsewhere. No pain on movement of right toes.  EXTREMITIES: No clubbing, cyanosis, or edema.  LYMPH: No lymphadenopathy noted.  SKIN: Hyperpigmented skin lesion along bilateral skin, dry skin.    Cardiac cath from August 2017 reviewed,  mild stenosis of left main, LAD, circumflex  right dominant, mid-RCA 99% lesion intervened, distal RCA 50%, 1st posterolateral segment 70% in proximal third    # Atypical Chest Pain likely 2/2 GERD, unlikely cardiac etiology  - tele-monitoring, trend cardiac enzymes  - c/w protonix  - discontinue telemetry if ACS is ruled out  - EKG independently reviewed, normal sinus rhythm with RBBB    # CAD s/p PCI+stentx1 to mid RCA (1 year ago)  - c/w aspirin, plavix, toprol, statin (uses ezetimibe at home)    # Right foot pain likely foot sprain vs. gout, non-traumatic  - started on low dose prednisone trial, lidocaine gel   - no fracture seen on X-ray    # HTN, stable  - c/w home medications: amlodipine, lisinopril and HCTZ    # DM II   - on actos, insulin regimen    # BPH  - c/w flomax    # GERD  - c/w protonix    # CKD Stage 3, stable  - monitor BMP, avoid nephrotoxic agents    All labs, radiologic studies, vitals, orders and medications list reviewed. Patient is seen and examined independently at bedside. I agree with the resident's note, history and plan as above. Pt states that the pain in chest is different from angina pain he experiences prior to getting stent. Otherwise, pt confirmed resolution of pain upon taking wilfredo tea. Pt denies chest pain, pressure, SOB, palpitations on exertion. Pt presented mainly for right foot pain rather than chest pain.    T(F): 97.5 (11-20-18 @ 16:25), Max: 97.5 (11-20-18 @ 16:25)  HR: 68 (11-20-18 @ 16:25) (68 - 90)  BP: 133/62 (11-20-18 @ 16:25) (133/62 - 135/63)  RR: 18 (11-20-18 @ 16:25) (18 - 18)  SpO2: 97% (11-20-18 @ 16:25) (97% - 97%)    PHYSICAL EXAM:  CONSTITUTIONAL: NAD, well-groomed, well-developed.  HEAD:  Atraumatic, Normocephalic.  EYES: EOMI, PERRLA, conjunctiva and sclera clear.  ENMT: Moist mucous membranes, No lesions.  NERVOUS SYSTEM:  Alert & Oriented X3, Good concentration; No limb weakness or numbness.  RESPIRATORY: Clear to ascultation bilaterally; No rales, rhonchi, wheezing, or rubs.  CARDIOVASCULAR: Regular rate and rhythm; No murmurs, rubs, or gallops.  GASTROINTESTINAL: Soft, Nontender, Nondistended; Bowel sounds present.  MUSCULOSKELETAL: No joint swelling or tenderness. No neck or back pain. Tenderness on dorsal right 5th metatarsal otherwise, no tenderness elsewhere. No pain on movement of right toes.  EXTREMITIES: No clubbing, cyanosis, or edema.  LYMPH: No lymphadenopathy noted.  SKIN: Hyperpigmented skin lesion along bilateral skin, dry skin.    Cardiac cath from August 2017 reviewed,  mild stenosis of left main, LAD, circumflex  right dominant, mid-RCA 99% lesion intervened, distal RCA 50%, 1st posterolateral segment 70% in proximal third    # Atypical Chest Pain likely 2/2 GERD, unlikely cardiac etiology, no longer having symptoms  - tele-monitoring, trend cardiac enzymes  - c/w protonix  - discontinue telemetry if ACS is ruled out  - EKG independently reviewed, normal sinus rhythm with RBBB    # CAD s/p PCI+stentx1 to mid RCA (1 year ago)  - c/w aspirin, plavix, toprol, statin (uses ezetimibe at home)    # Right foot pain likely foot sprain vs. gout, non-traumatic  - started on low dose prednisone trial, lidocaine gel   - no fracture seen on X-ray  - ambulate patient in AM  - PT/Rehab if pt cannot walk but pt seems to have good baseline    # HTN, stable  - c/w home medications: amlodipine, lisinopril and HCTZ    # DM II   - on actos, insulin regimen    # BPH  - c/w flomax    # GERD  - c/w protonix    # CKD Stage 3, stable  - monitor BMP, avoid nephrotoxic agents    Dispo: If ACS ruled out and improvement in right foot pain, can consider possible discharge if no further symptoms.     All labs, radiologic studies, vitals, orders and medications list reviewed.

## 2018-11-20 NOTE — ED PROVIDER NOTE - MEDICAL DECISION MAKING DETAILS
chest pain low suspicion, right foot pain, no infection on exam, possible gout. admit to tele for further eval. xrays foot and cxray wnl

## 2018-11-20 NOTE — H&P ADULT - NSHPLABSRESULTS_GEN_ALL_CORE
CARDIAC MARKERS ( 20 Nov 2018 11:10 )  x     / <0.01 ng/mL / x     / x     / x                            13.8   8.34  )-----------( 311      ( 20 Nov 2018 11:10 )             42.6     11-20  137  |  98  |  22<H>  ----------------------------<  180<H>  5.6<H>   |  24  |  1.6<H>  Ca    9.7      20 Nov 2018 11:10  TPro  7.9  /  Alb  4.5  /  TBili  <0.2  /  DBili  x   /  AST  29  /  ALT  21  /  AlkPhos  85  11-20  LIVER FUNCTIONS - ( 20 Nov 2018 11:10 )  Alb: 4.5 g/dL / Pro: 7.9 g/dL / ALK PHOS: 85 U/L / ALT: 21 U/L / AST: 29 U/L / GGT: x           CXR: neg  R Foot Xray: no acute fx CARDIAC MARKERS ( 20 Nov 2018 11:10 )  x     / <0.01 ng/mL / x     / x     / x                            13.8   8.34  )-----------( 311      ( 20 Nov 2018 11:10 )             42.6     11-20  137  |  98  |  22<H>  ----------------------------<  180<H>  5.6<H>   |  24  |  1.6<H>  Ca    9.7      20 Nov 2018 11:10  TPro  7.9  /  Alb  4.5  /  TBili  <0.2  /  DBili  x   /  AST  29  /  ALT  21  /  AlkPhos  85  11-20  LIVER FUNCTIONS - ( 20 Nov 2018 11:10 )  Alb: 4.5 g/dL / Pro: 7.9 g/dL / ALK PHOS: 85 U/L / ALT: 21 U/L / AST: 29 U/L / GGT: x           CXR: neg  R Foot Xray: no acute fx    Echo 8/2017:  Left ventricle: Systolic function was mildly to moderately reduced. Ejection  fraction was estimated in the range of 40 % to 45 %. There was hypokinesis of  the basal-mid inferior and basal-mid inferolateral wall(s). Features were  consistent with a pseudonormal left ventricular filling pattern, with  concomitant abnormal relaxation and increased filling pressure (grade 2  diastolic dysfunction).  Mitral valve: There was mild to moderate regurgitation.  Left atrium: The atrium was mildly dilated.  Pulmonary arteries: Systolic pressure was mildly increased.  Tricuspid valve: There was mild to moderate regurgitation.

## 2018-11-20 NOTE — H&P ADULT - ASSESSMENT
1. Atypical Chest Pain likely 2/2 GERD vs r/o ACS, hx of CAD s/p CABG  -admit to tele, trend CE, check echo  -reinstate home protonix  -c/w asa, plavix, toprol, statin (uses ezetimibe at home)    2. R foot pain  -xray neg for fx  -tx for gout with trial of pred  -avoid NSAIDS due to CKD  -will apply topical lidocaine as well since dx of gout is not too convincing based on clinical exam    3. HTN: c/w amlodipine, lisinopril-hctz  4. DMII: c/w pioglitazone, lantus (uses triseba at home), check FS, low carb consistent diet  5. BPH: c/w flomax  6. GERD: c/w protonix  7. CKD 3a: stable, avoid nephrotoxic agents  8. DVT PPx: hep sq 1. Atypical Chest Pain likely 2/2 GERD vs r/o ACS, hx of CAD s/p PCIx1 (1 yr ago)  -admit to tele, trend CE, check echo  -reinstate home protonix  -c/w asa, plavix, toprol, statin (uses ezetimibe at home)  -no need for Cardio c/s at this time    2. R foot pain  -xray neg for fx  -tx for gout with trial of pred  -avoid NSAIDS due to CKD  -will apply topical lidocaine as well since dx of gout is not too convincing based on clinical exam    3. HTN: c/w amlodipine, lisinopril-hctz  4. DMII: c/w pioglitazone, lantus (uses triseba at home), check FS, low carb consistent diet  5. BPH: c/w flomax  6. GERD: c/w protonix  7. CKD 3a: stable, avoid nephrotoxic agents  8. DVT PPx: hep sq

## 2018-11-21 ENCOUNTER — TRANSCRIPTION ENCOUNTER (OUTPATIENT)
Age: 68
End: 2018-11-21

## 2018-11-21 VITALS — WEIGHT: 191.8 LBS

## 2018-11-21 DIAGNOSIS — Z71.89 OTHER SPECIFIED COUNSELING: ICD-10-CM

## 2018-11-21 RX ORDER — PANTOPRAZOLE SODIUM 20 MG/1
1 TABLET, DELAYED RELEASE ORAL
Qty: 30 | Refills: 0
Start: 2018-11-21 | End: 2018-12-20

## 2018-11-21 RX ORDER — PANTOPRAZOLE SODIUM 20 MG/1
1 TABLET, DELAYED RELEASE ORAL
Qty: 0 | Refills: 0 | COMMUNITY

## 2018-11-21 RX ADMIN — HEPARIN SODIUM 5000 UNIT(S): 5000 INJECTION INTRAVENOUS; SUBCUTANEOUS at 05:48

## 2018-11-21 RX ADMIN — LIDOCAINE 1 APPLICATION(S): 4 CREAM TOPICAL at 05:50

## 2018-11-21 RX ADMIN — AMLODIPINE BESYLATE 10 MILLIGRAM(S): 2.5 TABLET ORAL at 05:49

## 2018-11-21 RX ADMIN — Medication 5 MILLIGRAM(S): at 05:49

## 2018-11-21 RX ADMIN — PANTOPRAZOLE SODIUM 40 MILLIGRAM(S): 20 TABLET, DELAYED RELEASE ORAL at 06:05

## 2018-11-21 RX ADMIN — Medication 12.5 MILLIGRAM(S): at 05:48

## 2018-11-21 RX ADMIN — LISINOPRIL 10 MILLIGRAM(S): 2.5 TABLET ORAL at 05:50

## 2018-11-21 RX ADMIN — Medication 25 MILLIGRAM(S): at 05:47

## 2018-11-21 NOTE — DISCHARGE NOTE ADULT - HOSPITAL COURSE
67 y/o M with PMHx CAD s/ PCI x1 (1 yr ago with Dr Shell), CKD 3A, DMII, CHF, GERD, HTN, DLD, and gout presenting for 2 episodes of chest pain and R foot pain.    #)Chest pain is likely due to heart burn, trops negative  c/w protonix OD and follow up with primary care and GI as an OP.    #)foot pain likely due to gout improved with prednisone  will d/c on prednisone for 5 days    Follow up with Primary care and GI, cardiology as an OP.

## 2018-11-21 NOTE — DISCHARGE NOTE ADULT - MEDICATION SUMMARY - MEDICATIONS TO TAKE
I will START or STAY ON the medications listed below when I get home from the hospital:    Triseba  -- 20 unit(s) daily  -- Indication: For Diabetes    predniSONE 20 mg oral tablet  -- 2 tab(s) by mouth once a day   -- It is very important that you take or use this exactly as directed.  Do not skip doses or discontinue unless directed by your doctor.  Obtain medical advice before taking any non-prescription drugs as some may affect the action of this medication.  Take with food or milk.    -- Indication: For Gout    Aspir 81 oral delayed release tablet  -- 1 tab(s) by mouth once a day  -- Indication: For CAD (coronary artery disease)    Flomax 0.4 mg oral capsule  -- 1 cap(s) by mouth once a day  -- Indication: For bph    pioglitazone 15 mg oral tablet  -- 1 tab(s) by mouth once a day  -- Indication: For Diabetes    Trulicity Pen 0.75 mg/0.5 mL subcutaneous solution  -- subcutaneous once a week  -- Indication: For Diabetes    ezetimibe 10 mg oral tablet  -- 1 tab(s) by mouth once a day  -- Indication: For DLD    lisinopril-hydroCHLOROthiazide 10 mg-12.5 mg oral tablet  -- 1 tab(s) by mouth once a day  -- Indication: For Hypertension    Plavix 75 mg oral tablet  -- 1 tab(s) by mouth once a day  -- Indication: For CAD (coronary artery disease)    Toprol-XL 25 mg oral tablet, extended release  -- 1 tab(s) by mouth once a day  -- Indication: For CAD (coronary artery disease)    amLODIPine 10 mg oral tablet  -- orally once a day  -- Indication: For HTN (hypertension)    pantoprazole 40 mg oral delayed release tablet  -- 1 tab(s) by mouth once a day   -- It is very important that you take or use this exactly as directed.  Do not skip doses or discontinue unless directed by your doctor.  Obtain medical advice before taking any non-prescription drugs as some may affect the action of this medication.  Swallow whole.  Do not crush.    -- Indication: For GERD (gastroesophageal reflux disease) I will START or STAY ON the medications listed below when I get home from the hospital:    Triseba  -- 20 unit(s) daily  -- Indication: For Dm    predniSONE 20 mg oral tablet  -- 2 tab(s) by mouth once a day   -- It is very important that you take or use this exactly as directed.  Do not skip doses or discontinue unless directed by your doctor.  Obtain medical advice before taking any non-prescription drugs as some may affect the action of this medication.  Take with food or milk.    -- Indication: For Gout    Aspir 81 oral delayed release tablet  -- 1 tab(s) by mouth once a day  -- Indication: For CAD (coronary artery disease)    Flomax 0.4 mg oral capsule  -- 1 cap(s) by mouth once a day  -- Indication: For bph    pioglitazone 15 mg oral tablet  -- 1 tab(s) by mouth once a day  -- Indication: For Dm    Trulicity Pen 0.75 mg/0.5 mL subcutaneous solution  -- subcutaneous once a week  -- Indication: For Dm    ezetimibe 10 mg oral tablet  -- 1 tab(s) by mouth once a day  -- Indication: For Dld    lisinopril-hydroCHLOROthiazide 10 mg-12.5 mg oral tablet  -- 1 tab(s) by mouth once a day  -- Indication: For HTN (hypertension)    Plavix 75 mg oral tablet  -- 1 tab(s) by mouth once a day  -- Indication: For CAD (coronary artery disease)    Toprol-XL 25 mg oral tablet, extended release  -- 1 tab(s) by mouth once a day  -- Indication: For CAD (coronary artery disease)    amLODIPine 10 mg oral tablet  -- orally once a day  -- Indication: For HTN (hypertension)    pantoprazole 40 mg oral delayed release tablet  -- 1 tab(s) by mouth once a day   -- It is very important that you take or use this exactly as directed.  Do not skip doses or discontinue unless directed by your doctor.  Obtain medical advice before taking any non-prescription drugs as some may affect the action of this medication.  Swallow whole.  Do not crush.    -- Indication: For GERD (gastroesophageal reflux disease)

## 2018-11-21 NOTE — DISCHARGE NOTE ADULT - CARE PROVIDERS DIRECT ADDRESSES
[email or web address]

## 2018-11-21 NOTE — DISCHARGE NOTE ADULT - CARE PROVIDER_API CALL
Sonia Luna), Geriatric Medicine; Internal Medicine  [address] [place] [address]  Phone: [phone]  Fax: [phone]    Mike Shell), Cardiovascular Disease; Internal Medicine; Interventional Cardiology  [address] [place] [address]  Phone: [phone]  Fax: [phone]

## 2018-11-21 NOTE — DISCHARGE NOTE ADULT - CARE PLAN
Principal Discharge DX:	Chest pain  Goal:	prevent future episodes  Assessment and plan of treatment:	Your chest pain is likely due to heart burn (GERD) or gastritis please take protonix as prescribed and follow up with your primary care doctor in 2-3 weeks and GI doctor in 1-2 weeks as an OP  Secondary Diagnosis:	Foot pain, right  Goal:	Medical management  Assessment and plan of treatment:	Your foot pain is likely due to gout, please take prednisone for 5 days and follow up with your primary in 2-3weeks

## 2018-11-21 NOTE — DISCHARGE NOTE ADULT - PATIENT PORTAL LINK FT
You can access the [facility] Patient Portal, offered by [facility], by registering with the following website: [email or web address]

## 2018-11-21 NOTE — DISCHARGE NOTE ADULT - ADDITIONAL INSTRUCTIONS
follow up with your primary care doctor in 2-3 weeks and GI doctor in 1-2 weeks as an OP  please take prednisone for 5 day and stop

## 2018-11-21 NOTE — PROGRESS NOTE ADULT - SUBJECTIVE AND OBJECTIVE BOX
<<<ATTENDING DISCHARGE NOTE>>>     CHYNA PARIS  MRN-[id number]    VITAL SIGNS:  T(F): 96.4 (11-21-18 @ 07:34), Max: 97.5 (11-20-18 @ 16:25)  HR: 78 (11-21-18 @ 07:34)  BP: 141/65 (11-21-18 @ 07:34)  SpO2: 100% (11-21-18 @ 07:34)      PHYSICAL EXAMINATION:  PHYSICAL EXAM:  GENERAL: NAD, speaks in full sentences, no signs of respiratory distress  HEAD:  Atraumatic, Normocephalic  EYES: EOMI, PERRLA, conjunctiva and sclera clear  NECK: Supple, No JVD  CHEST/LUNG: Clear to auscultation bilaterally; No wheeze; No crackles; No accessory muscles used  HEART: Regular rate and rhythm; No murmurs;   ABDOMEN: Soft, Nontender, Nondistended; Bowel sounds present; No guarding  EXTREMITIES:  2+ Peripheral Pulses, No cyanosis or edema, no swelling in joints  PSYCH: AAOx3  NEUROLOGY: non-focal  SKIN: No rashes or lesions    TEST RESULTS:                        13.8   8.34  )-----------( 311      ( 20 Nov 2018 11:10 )             42.6       11-20    138  |  98  |  26<H>  ----------------------------<  183<H>  4.9   |  26  |  1.8<H>    Ca    9.4      20 Nov 2018 20:42    TPro  7.9  /  Alb  4.5  /  TBili  <0.2  /  DBili  x   /  AST  29  /  ALT  21  /  AlkPhos  85  11-20      FINAL DISCHARGE INTERVIEW:  Resident(s) Present: (Name:Dr. Lopez, RN Present:      Pt medically stable for discharge. No further chest pain, cardiac enzymes normal, no EKG changes. Pt wants to go to his GI doctor. He has name at home and will make appointment, doesn't recall name currently.    DISPOSITION:   [ x ] Home,    [  ] Home with Visiting Nursing Services,   [    ]  SNF/ NH,    [   ] Acute Rehab (4A),   [   ] Other (Specify:_________)

## 2018-11-21 NOTE — DISCHARGE NOTE ADULT - PLAN OF CARE
prevent future episodes Your chest pain is likely due to heart burn (GERD) or gastritis please take protonix as prescribed and follow up with your primary care doctor in 2-3 weeks and GI doctor in 1-2 weeks as an OP Medical management Your foot pain is likely due to gout, please take prednisone for 5 days and follow up with your primary in 2-3weeks

## 2018-11-27 DIAGNOSIS — K21.9 GASTRO-ESOPHAGEAL REFLUX DISEASE WITHOUT ESOPHAGITIS: ICD-10-CM

## 2018-11-27 DIAGNOSIS — I25.10 ATHEROSCLEROTIC HEART DISEASE OF NATIVE CORONARY ARTERY WITHOUT ANGINA PECTORIS: ICD-10-CM

## 2018-11-27 DIAGNOSIS — I45.10 UNSPECIFIED RIGHT BUNDLE-BRANCH BLOCK: ICD-10-CM

## 2018-11-27 DIAGNOSIS — M10.9 GOUT, UNSPECIFIED: ICD-10-CM

## 2018-11-27 DIAGNOSIS — E78.5 HYPERLIPIDEMIA, UNSPECIFIED: ICD-10-CM

## 2018-11-27 DIAGNOSIS — N18.3 CHRONIC KIDNEY DISEASE, STAGE 3 (MODERATE): ICD-10-CM

## 2018-11-27 DIAGNOSIS — N40.0 BENIGN PROSTATIC HYPERPLASIA WITHOUT LOWER URINARY TRACT SYMPTOMS: ICD-10-CM

## 2018-11-27 DIAGNOSIS — I13.0 HYPERTENSIVE HEART AND CHRONIC KIDNEY DISEASE WITH HEART FAILURE AND STAGE 1 THROUGH STAGE 4 CHRONIC KIDNEY DISEASE, OR UNSPECIFIED CHRONIC KIDNEY DISEASE: ICD-10-CM

## 2018-11-27 DIAGNOSIS — E11.22 TYPE 2 DIABETES MELLITUS WITH DIABETIC CHRONIC KIDNEY DISEASE: ICD-10-CM

## 2018-11-27 DIAGNOSIS — I50.9 HEART FAILURE, UNSPECIFIED: ICD-10-CM

## 2018-11-27 DIAGNOSIS — Z98.61 CORONARY ANGIOPLASTY STATUS: ICD-10-CM

## 2018-11-27 DIAGNOSIS — R07.9 CHEST PAIN, UNSPECIFIED: ICD-10-CM

## 2018-11-27 DIAGNOSIS — Z87.891 PERSONAL HISTORY OF NICOTINE DEPENDENCE: ICD-10-CM

## 2018-11-27 PROBLEM — E11.9 TYPE 2 DIABETES MELLITUS WITHOUT COMPLICATIONS: Chronic | Status: ACTIVE | Noted: 2018-11-20

## 2018-11-27 PROBLEM — I10 ESSENTIAL (PRIMARY) HYPERTENSION: Chronic | Status: ACTIVE | Noted: 2018-11-20

## 2018-11-28 ENCOUNTER — OUTPATIENT (OUTPATIENT)
Dept: OUTPATIENT SERVICES | Facility: HOSPITAL | Age: 68
LOS: 1 days | Discharge: HOME | End: 2018-11-28

## 2018-11-28 ENCOUNTER — APPOINTMENT (OUTPATIENT)
Dept: ENDOCRINOLOGY | Facility: CLINIC | Age: 68
End: 2018-11-28

## 2018-11-28 DIAGNOSIS — Z98.890 OTHER SPECIFIED POSTPROCEDURAL STATES: Chronic | ICD-10-CM

## 2018-11-29 RX ORDER — ELECTROLYTES/DEXTROSE
31G X 8 MM SOLUTION, ORAL ORAL
Qty: 3 | Refills: 3 | Status: DISCONTINUED | COMMUNITY
Start: 2017-11-08 | End: 2018-11-29

## 2018-12-03 DIAGNOSIS — E66.01 MORBID (SEVERE) OBESITY DUE TO EXCESS CALORIES: ICD-10-CM

## 2018-12-03 DIAGNOSIS — E11.65 TYPE 2 DIABETES MELLITUS WITH HYPERGLYCEMIA: ICD-10-CM

## 2018-12-03 DIAGNOSIS — E11.22 TYPE 2 DIABETES MELLITUS WITH DIABETIC CHRONIC KIDNEY DISEASE: ICD-10-CM

## 2018-12-03 DIAGNOSIS — E78.00 PURE HYPERCHOLESTEROLEMIA, UNSPECIFIED: ICD-10-CM

## 2018-12-18 ENCOUNTER — RX RENEWAL (OUTPATIENT)
Age: 68
End: 2018-12-18

## 2019-02-14 ENCOUNTER — APPOINTMENT (OUTPATIENT)
Dept: GERIATRICS | Facility: CLINIC | Age: 69
End: 2019-02-14

## 2019-02-15 ENCOUNTER — APPOINTMENT (OUTPATIENT)
Dept: GASTROENTEROLOGY | Facility: CLINIC | Age: 69
End: 2019-02-15

## 2019-04-18 ENCOUNTER — APPOINTMENT (OUTPATIENT)
Dept: ENDOCRINOLOGY | Facility: CLINIC | Age: 69
End: 2019-04-18

## 2019-06-18 ENCOUNTER — EMERGENCY (EMERGENCY)
Facility: HOSPITAL | Age: 69
LOS: 0 days | Discharge: HOME | End: 2019-06-18
Attending: EMERGENCY MEDICINE | Admitting: EMERGENCY MEDICINE
Payer: SUBSIDIZED

## 2019-06-18 VITALS
RESPIRATION RATE: 18 BRPM | DIASTOLIC BLOOD PRESSURE: 84 MMHG | TEMPERATURE: 97 F | HEART RATE: 103 BPM | SYSTOLIC BLOOD PRESSURE: 178 MMHG | OXYGEN SATURATION: 97 %

## 2019-06-18 VITALS
HEART RATE: 81 BPM | TEMPERATURE: 99 F | DIASTOLIC BLOOD PRESSURE: 76 MMHG | OXYGEN SATURATION: 98 % | SYSTOLIC BLOOD PRESSURE: 138 MMHG | RESPIRATION RATE: 18 BRPM

## 2019-06-18 DIAGNOSIS — Z79.2 LONG TERM (CURRENT) USE OF ANTIBIOTICS: ICD-10-CM

## 2019-06-18 DIAGNOSIS — E11.9 TYPE 2 DIABETES MELLITUS WITHOUT COMPLICATIONS: ICD-10-CM

## 2019-06-18 DIAGNOSIS — Z79.899 OTHER LONG TERM (CURRENT) DRUG THERAPY: ICD-10-CM

## 2019-06-18 DIAGNOSIS — R50.9 FEVER, UNSPECIFIED: ICD-10-CM

## 2019-06-18 DIAGNOSIS — I11.0 HYPERTENSIVE HEART DISEASE WITH HEART FAILURE: ICD-10-CM

## 2019-06-18 DIAGNOSIS — I50.9 HEART FAILURE, UNSPECIFIED: ICD-10-CM

## 2019-06-18 DIAGNOSIS — Z79.891 LONG TERM (CURRENT) USE OF OPIATE ANALGESIC: ICD-10-CM

## 2019-06-18 DIAGNOSIS — Z98.890 OTHER SPECIFIED POSTPROCEDURAL STATES: Chronic | ICD-10-CM

## 2019-06-18 DIAGNOSIS — Z79.52 LONG TERM (CURRENT) USE OF SYSTEMIC STEROIDS: ICD-10-CM

## 2019-06-18 DIAGNOSIS — J32.9 CHRONIC SINUSITIS, UNSPECIFIED: ICD-10-CM

## 2019-06-18 DIAGNOSIS — I25.10 ATHEROSCLEROTIC HEART DISEASE OF NATIVE CORONARY ARTERY WITHOUT ANGINA PECTORIS: ICD-10-CM

## 2019-06-18 DIAGNOSIS — Z79.84 LONG TERM (CURRENT) USE OF ORAL HYPOGLYCEMIC DRUGS: ICD-10-CM

## 2019-06-18 DIAGNOSIS — I63.9 CEREBRAL INFARCTION, UNSPECIFIED: ICD-10-CM

## 2019-06-18 LAB
ALBUMIN SERPL ELPH-MCNC: 4 G/DL — SIGNIFICANT CHANGE UP (ref 3.5–5.2)
ALP SERPL-CCNC: 102 U/L — SIGNIFICANT CHANGE UP (ref 30–115)
ALT FLD-CCNC: 34 U/L — SIGNIFICANT CHANGE UP (ref 0–41)
ANION GAP SERPL CALC-SCNC: 14 MMOL/L — SIGNIFICANT CHANGE UP (ref 7–14)
APPEARANCE UR: CLEAR — SIGNIFICANT CHANGE UP
AST SERPL-CCNC: 25 U/L — SIGNIFICANT CHANGE UP (ref 0–41)
BASOPHILS # BLD AUTO: 0.07 K/UL — SIGNIFICANT CHANGE UP (ref 0–0.2)
BASOPHILS NFR BLD AUTO: 0.8 % — SIGNIFICANT CHANGE UP (ref 0–1)
BILIRUB DIRECT SERPL-MCNC: <0.2 MG/DL — SIGNIFICANT CHANGE UP (ref 0–0.2)
BILIRUB INDIRECT FLD-MCNC: SIGNIFICANT CHANGE UP MG/DL (ref 0.2–1.2)
BILIRUB SERPL-MCNC: <0.2 MG/DL — SIGNIFICANT CHANGE UP (ref 0.2–1.2)
BILIRUB UR-MCNC: NEGATIVE — SIGNIFICANT CHANGE UP
BUN SERPL-MCNC: 36 MG/DL — HIGH (ref 10–20)
CALCIUM SERPL-MCNC: 9.6 MG/DL — SIGNIFICANT CHANGE UP (ref 8.5–10.1)
CHLORIDE SERPL-SCNC: 98 MMOL/L — SIGNIFICANT CHANGE UP (ref 98–110)
CO2 SERPL-SCNC: 22 MMOL/L — SIGNIFICANT CHANGE UP (ref 17–32)
COLOR SPEC: YELLOW — SIGNIFICANT CHANGE UP
CREAT SERPL-MCNC: 1.8 MG/DL — HIGH (ref 0.7–1.5)
DIFF PNL FLD: NEGATIVE — SIGNIFICANT CHANGE UP
EOSINOPHIL # BLD AUTO: 0.39 K/UL — SIGNIFICANT CHANGE UP (ref 0–0.7)
EOSINOPHIL NFR BLD AUTO: 4.5 % — SIGNIFICANT CHANGE UP (ref 0–8)
EPI CELLS # UR: ABNORMAL /HPF
GLUCOSE SERPL-MCNC: 258 MG/DL — HIGH (ref 70–99)
GLUCOSE UR QL: 500 MG/DL
HCT VFR BLD CALC: 44.9 % — SIGNIFICANT CHANGE UP (ref 42–52)
HGB BLD-MCNC: 14.5 G/DL — SIGNIFICANT CHANGE UP (ref 14–18)
IMM GRANULOCYTES NFR BLD AUTO: 0.9 % — HIGH (ref 0.1–0.3)
KETONES UR-MCNC: NEGATIVE — SIGNIFICANT CHANGE UP
LACTATE SERPL-SCNC: 2.1 MMOL/L — SIGNIFICANT CHANGE UP (ref 0.5–2.2)
LEUKOCYTE ESTERASE UR-ACNC: NEGATIVE — SIGNIFICANT CHANGE UP
LYMPHOCYTES # BLD AUTO: 2.13 K/UL — SIGNIFICANT CHANGE UP (ref 1.2–3.4)
LYMPHOCYTES # BLD AUTO: 24.4 % — SIGNIFICANT CHANGE UP (ref 20.5–51.1)
MAGNESIUM SERPL-MCNC: 1.7 MG/DL — LOW (ref 1.8–2.4)
MCHC RBC-ENTMCNC: 26.5 PG — LOW (ref 27–31)
MCHC RBC-ENTMCNC: 32.3 G/DL — SIGNIFICANT CHANGE UP (ref 32–37)
MCV RBC AUTO: 82.1 FL — SIGNIFICANT CHANGE UP (ref 80–94)
MONOCYTES # BLD AUTO: 0.56 K/UL — SIGNIFICANT CHANGE UP (ref 0.1–0.6)
MONOCYTES NFR BLD AUTO: 6.4 % — SIGNIFICANT CHANGE UP (ref 1.7–9.3)
NEUTROPHILS # BLD AUTO: 5.49 K/UL — SIGNIFICANT CHANGE UP (ref 1.4–6.5)
NEUTROPHILS NFR BLD AUTO: 63 % — SIGNIFICANT CHANGE UP (ref 42.2–75.2)
NITRITE UR-MCNC: NEGATIVE — SIGNIFICANT CHANGE UP
NRBC # BLD: 0 /100 WBCS — SIGNIFICANT CHANGE UP (ref 0–0)
PH UR: 5.5 — SIGNIFICANT CHANGE UP (ref 5–8)
PLATELET # BLD AUTO: 407 K/UL — HIGH (ref 130–400)
POTASSIUM SERPL-MCNC: 5.2 MMOL/L — HIGH (ref 3.5–5)
POTASSIUM SERPL-SCNC: 5.2 MMOL/L — HIGH (ref 3.5–5)
PROT SERPL-MCNC: 8.1 G/DL — HIGH (ref 6–8)
PROT UR-MCNC: 30 MG/DL
RBC # BLD: 5.47 M/UL — SIGNIFICANT CHANGE UP (ref 4.7–6.1)
RBC # FLD: 14.1 % — SIGNIFICANT CHANGE UP (ref 11.5–14.5)
SODIUM SERPL-SCNC: 134 MMOL/L — LOW (ref 135–146)
SP GR SPEC: 1.02 — SIGNIFICANT CHANGE UP (ref 1.01–1.03)
UROBILINOGEN FLD QL: 0.2 MG/DL — SIGNIFICANT CHANGE UP (ref 0.2–0.2)
WBC # BLD: 8.72 K/UL — SIGNIFICANT CHANGE UP (ref 4.8–10.8)
WBC # FLD AUTO: 8.72 K/UL — SIGNIFICANT CHANGE UP (ref 4.8–10.8)
WBC UR QL: SIGNIFICANT CHANGE UP /HPF

## 2019-06-18 PROCEDURE — 99284 EMERGENCY DEPT VISIT MOD MDM: CPT

## 2019-06-18 PROCEDURE — 71046 X-RAY EXAM CHEST 2 VIEWS: CPT | Mod: 26

## 2019-06-18 PROCEDURE — 70487 CT MAXILLOFACIAL W/DYE: CPT | Mod: 26

## 2019-06-18 RX ORDER — AMOXICILLIN 250 MG/5ML
1 SUSPENSION, RECONSTITUTED, ORAL (ML) ORAL
Qty: 14 | Refills: 0
Start: 2019-06-18 | End: 2019-06-24

## 2019-06-18 RX ORDER — SODIUM CHLORIDE 9 MG/ML
1000 INJECTION, SOLUTION INTRAVENOUS ONCE
Refills: 0 | Status: COMPLETED | OUTPATIENT
Start: 2019-06-18 | End: 2019-06-18

## 2019-06-18 RX ORDER — MAGNESIUM SULFATE 500 MG/ML
2 VIAL (ML) INJECTION ONCE
Refills: 0 | Status: COMPLETED | OUTPATIENT
Start: 2019-06-18 | End: 2019-06-18

## 2019-06-18 RX ADMIN — Medication 50 GRAM(S): at 12:32

## 2019-06-18 RX ADMIN — SODIUM CHLORIDE 1000 MILLILITER(S): 9 INJECTION, SOLUTION INTRAVENOUS at 11:05

## 2019-06-18 RX ADMIN — Medication 2 GRAM(S): at 13:32

## 2019-06-18 RX ADMIN — SODIUM CHLORIDE 1000 MILLILITER(S): 9 INJECTION, SOLUTION INTRAVENOUS at 12:05

## 2019-06-18 NOTE — ED PROVIDER NOTE - NSFOLLOWUPINSTRUCTIONS_ED_ALL_ED_FT
Sinusitis, Adult  ImageSinusitis is soreness and swelling (inflammation) of your sinuses. Sinuses are hollow spaces in the bones around your face. They are located:  Around your eyes.  In the middle of your forehead.  Behind your nose.  In your cheekbones.  Your sinuses and nasal passages are lined with a stringy fluid (mucus). Mucus normally drains out of your sinuses. Swelling can trap mucus in your sinuses. This lets germs like bacteria or viruses grow, and that leads to infection. Most of the time, sinusitis is caused by a virus.    If bacteria is causing your infection, your doctor may have you wait and see if you get better without antibiotic medicine. You may be prescribed antibiotics if you have:  A very bad infection.  A weak disease-fighting (immune) system.  Follow these instructions at home:  Medicines     Take, use, or apply over-the-counter and prescription medicines only as told by your doctor. These may include nasal sprays.  If you were prescribed an antibiotic, take it as told by your doctor. Do not stop taking the antibiotic even if you start to feel better.  Hydrate and Humidify     Drink enough water to keep your pee (urine) pale yellow.  Use a cool mist humidifier to keep the humidity level in your home above 50%.  Breathe in steam for 10–15 minutes, 3–4 times a day or as told by your doctor. You can do this in the bathroom while a hot shower is running.  Try not to spend time in cool or dry air.  Rest     Rest as much as possible.  Sleep with your head raised (elevated).  Make sure to get enough sleep each night.  General instructions     Put a warm, moist washcloth on your face 3–4 times a day, or as often as told by your doctor. This will help with discomfort.  Wash your hands often with soap and water. If there is no soap and water, use hand .  Do not smoke. Avoid being around people who are smoking (secondhand smoke).  Keep all follow-up visits as told by your doctor. This is important.  Contact a doctor if:  You have a fever.  Your symptoms get worse.  Your symptoms do not get better within 10 days.  Get help right away if:  You have a very bad headache.  You cannot stop throwing up (vomiting).  You have pain or swelling around your face or eyes.  You have trouble seeing.  You feel confused.  Your neck is stiff.  You have trouble breathing.  This information is not intended to replace advice given to you by your health care provider. Make sure you discuss any questions you have with your health care provider.

## 2019-06-18 NOTE — ED PROVIDER NOTE - NSFOLLOWUPCLINICS_GEN_ALL_ED_FT
[facility]  Medicine  [address], [place]   Phone: [phone]  Fax:   Follow Up Time:

## 2019-06-18 NOTE — ED PROVIDER NOTE - PROGRESS NOTE DETAILS
ATTENDING NOTE:   69 y/o M with PMH of HTN, HLD, presents to ED with fever x2.5 weeks starting while he was in [place] visiting family. Pt is from [place], lived in [place] since 1990’s. States while there had no sick contacts, +mosquito bites, lots of sinus congestion and pain, mild cough productive of white sputum, sometimes blowing blood from his nose, +whole body joint pains. Fevers since about 2 weeks ago have only been subjective. Stopped measuring his temperature and is taking only Tylenol with temporary relief then return of subjective fever. No CP, SOB, abdominal pain, N/V/D, urinary SX, rash.     Exam: NAD, NCAT, HEENT: mmm, EOMI, PERRLA, (+)TTP to b/l frontal and maxillary sinuses. Neck: supple, nontender, nl ROM, Heart: RRR, no murmur, Lungs: BCTA, no signs of increased WOB, Abd: NTND, no guarding or rebound, no hernia palpated, no CVAT. MSK: chest, back, and ext nontender, no focal joint TTP noted, nl rom, no deformity. Neuro: A&Ox3, normal strength, nl sensation throughout, normal speech.  A/P: Concern for possible Arbo virus vs sinusitis vs PNA. Fever pattern not compatible with malaria. Will give IVF, get labs, imaging and reassess. ATTENDING NOTE:   69 y/o M with PMH of HTN, HLD, presents to ED with fever x2.5 weeks starting while he was in [place]. visiting family. Pt is from [place], lived in [place] since 1990’s. States while there had no sick contacts, +mosquito bites, lots of sinus congestion and pain, mild cough productive of white sputum, sometimes blowing blood from his nose, +whole body joint pains. Fevers since about 2 weeks ago have only been subjective. Stopped measuring his temperature and is taking only Tylenol with temporary relief then return of subjective fever. No CP, SOB, abdominal pain, N/V/D, urinary SX, rash.     Exam: NAD, NCAT, HEENT: mmm, EOMI, PERRLA, (+)TTP to b/l frontal and maxillary sinuses. Neck: supple, nontender, nl ROM, Heart: RRR, no murmur, Lungs: BCTA, no signs of increased WOB, Abd: NTND, no guarding or rebound, no hernia palpated, no CVAT. MSK: chest, back, and ext nontender, no focal joint TTP noted, nl rom, no deformity. Neuro: A&Ox3, normal strength, nl sensation throughout, normal speech.  A/P: Concern for possible Arbo virus vs sinusitis vs PNA. Fever pattern not compatible with malarial pattern. Will give IVF, get labs, imaging and reassess.

## 2019-06-18 NOTE — ED PROVIDER NOTE - CLINICAL SUMMARY MEDICAL DECISION MAKING FREE TEXT BOX
pw subjective fevers for weeks after travel, daily by report. afebrile in ED. had sinus symptoms throughout. CT showing sinusitis. Rx abx. Feels well. Patient to be discharged from ED. Any available test results were discussed with patient and/or family. Verbal instructions given, including instructions to return to ED immediately for any new, worsening, or concerning symptoms. Patient endorsed understanding. Written discharge instructions additionally given, including follow-up plan.

## 2019-06-18 NOTE — ED ADULT NURSE NOTE - NSIMPLEMENTINTERV_GEN_ALL_ED
Implemented All Universal Safety Interventions:  [place] to call system. Call bell, personal items and telephone within reach. Instruct patient to call for assistance. Room bathroom lighting operational. Non-slip footwear when patient is off stretcher. Physically safe environment: no spills, clutter or unnecessary equipment. Stretcher in lowest position, wheels locked, appropriate side rails in place.

## 2019-06-18 NOTE — ED PROVIDER NOTE - PHYSICAL EXAMINATION
Exam: NAD, NCAT, HEENT: mmm, EOMI, PERRLA, (+)TTP to b/l frontal and maxillary sinuses. Neck: supple, nontender, nl ROM, Heart: RRR, no murmur, Lungs: BCTA, no signs of increased WOB, Abd: NTND, no guarding or rebound, no hernia palpated, no CVAT. MSK: chest, back, and ext nontender, no focal joint TTP noted, nl rom, no deformity. Neuro: A&Ox3, normal strength, nl sensation throughout, normal speech.

## 2019-06-18 NOTE — ED ADULT NURSE NOTE - OBJECTIVE STATEMENT
Patient c/o fever and cold like symptoms x2 weeks. Patient recently traveled to [place] got back on Thursday. Denies rash, n/v/d. Patient states he has sinus pressure and productive cough. On assessment resting comfortably , no s/s of distress noted at this time.

## 2019-06-18 NOTE — ED PROVIDER NOTE - OBJECTIVE STATEMENT
Pt is from [place], lived in  since 1990’s. States while there had no sick contacts, +mosquito bites, lots of sinus congestion and pain, mild cough productive of white sputum, sometimes blowing blood from his nose, +whole body joint pains. Fevers since about 2 weeks ago have only been subjective. Stopped measuring his temperature and is taking only Tylenol with temporary relief then return of subjective fever. No CP, SOB, abdominal pain, N/V/D, urinary SX, rash.

## 2019-06-19 LAB
CULTURE RESULTS: NO GROWTH — SIGNIFICANT CHANGE UP
SPECIMEN SOURCE: SIGNIFICANT CHANGE UP

## 2019-06-23 LAB
CULTURE RESULTS: SIGNIFICANT CHANGE UP
CULTURE RESULTS: SIGNIFICANT CHANGE UP
SPECIMEN SOURCE: SIGNIFICANT CHANGE UP
SPECIMEN SOURCE: SIGNIFICANT CHANGE UP

## 2019-07-03 ENCOUNTER — APPOINTMENT (OUTPATIENT)
Dept: ENDOCRINOLOGY | Facility: CLINIC | Age: 69
End: 2019-07-03

## 2019-07-03 ENCOUNTER — OUTPATIENT (OUTPATIENT)
Dept: OUTPATIENT SERVICES | Facility: HOSPITAL | Age: 69
LOS: 1 days | Discharge: HOME | End: 2019-07-03

## 2019-07-03 VITALS
HEART RATE: 83 BPM | SYSTOLIC BLOOD PRESSURE: 186 MMHG | BODY MASS INDEX: 30.07 KG/M2 | WEIGHT: 203 LBS | DIASTOLIC BLOOD PRESSURE: 97 MMHG | HEIGHT: 69 IN

## 2019-07-03 DIAGNOSIS — Z98.890 OTHER SPECIFIED POSTPROCEDURAL STATES: Chronic | ICD-10-CM

## 2019-07-03 DIAGNOSIS — E66.9 OBESITY, UNSPECIFIED: ICD-10-CM

## 2019-07-03 DIAGNOSIS — M60.9 MYOSITIS, UNSPECIFIED: ICD-10-CM

## 2019-07-03 DIAGNOSIS — T46.6X5A MYOSITIS, UNSPECIFIED: ICD-10-CM

## 2019-07-03 NOTE — ASSESSMENT
[FreeTextEntry1] : 68 M here for f/u on dm2, has not taken his medications for 2 weeks due to insurance issues.\par \par # DM2\par - Supposed to be on Tresiba / Trulicity / Amdelog / & Actos\par - Never took Amdelog, and is not sure if he ever took tresiba\par - Las t A1c 8.4% in 9/21/18\par patient has no health insurance, and stopped all medications. [Carbohydrate Consistent Diet] : carbohydrate consistent diet [Diabetes Foot Care] : diabetes foot care [Long Term Vascular Complications] : long term vascular complications of diabetes [Importance of Diet and Exercise] : importance of diet and exercise to improve glycemic control, achieve weight loss and improve cardiovascular health

## 2019-07-03 NOTE — HISTORY OF PRESENT ILLNESS
[FreeTextEntry1] : 68 M w/ pmh of DM2, here for follow up. The pt says his insurance is no longer active and has not taken any of his medications in 2 weeks.

## 2019-07-17 ENCOUNTER — MESSAGE (OUTPATIENT)
Age: 69
End: 2019-07-17

## 2019-07-17 DIAGNOSIS — E11.65 TYPE 2 DIABETES MELLITUS WITH HYPERGLYCEMIA: ICD-10-CM

## 2019-07-17 DIAGNOSIS — E11.22 TYPE 2 DIABETES MELLITUS WITH DIABETIC CHRONIC KIDNEY DISEASE: ICD-10-CM

## 2019-07-17 DIAGNOSIS — E78.00 PURE HYPERCHOLESTEROLEMIA, UNSPECIFIED: ICD-10-CM

## 2019-07-17 DIAGNOSIS — E66.01 MORBID (SEVERE) OBESITY DUE TO EXCESS CALORIES: ICD-10-CM

## 2019-07-18 ENCOUNTER — OUTPATIENT (OUTPATIENT)
Dept: OUTPATIENT SERVICES | Facility: HOSPITAL | Age: 69
LOS: 1 days | Discharge: HOME | End: 2019-07-18

## 2019-07-18 ENCOUNTER — APPOINTMENT (OUTPATIENT)
Dept: GERIATRICS | Facility: CLINIC | Age: 69
End: 2019-07-18
Payer: COMMERCIAL

## 2019-07-18 VITALS
HEIGHT: 69 IN | DIASTOLIC BLOOD PRESSURE: 92 MMHG | SYSTOLIC BLOOD PRESSURE: 170 MMHG | BODY MASS INDEX: 29.62 KG/M2 | HEART RATE: 76 BPM | WEIGHT: 200 LBS | TEMPERATURE: 98 F

## 2019-07-18 DIAGNOSIS — Z98.890 OTHER SPECIFIED POSTPROCEDURAL STATES: Chronic | ICD-10-CM

## 2019-07-18 PROCEDURE — 99214 OFFICE O/P EST MOD 30 MIN: CPT

## 2019-07-18 RX ORDER — ISOSORBIDE MONONITRATE 60 MG/1
60 TABLET, EXTENDED RELEASE ORAL DAILY
Qty: 90 | Refills: 3 | Status: DISCONTINUED | COMMUNITY
Start: 2017-09-06 | End: 2019-07-18

## 2019-07-18 RX ORDER — POLYETHYLENE GLYCOL 3350, SODIUM SULFATE ANHYDROUS, SODIUM BICARBONATE, SODIUM CHLORIDE, POTASSIUM CHLORIDE 227.1; 21.5; 6.36; 5.53; .754 G/L; G/L; G/L; G/L; G/L
227.1 POWDER, FOR SOLUTION ORAL
Qty: 1 | Refills: 0 | Status: DISCONTINUED | COMMUNITY
Start: 2018-10-19 | End: 2019-07-18

## 2019-07-18 RX ORDER — HYDROCHLOROTHIAZIDE 25 MG/1
25 TABLET ORAL DAILY
Qty: 30 | Refills: 5 | Status: DISCONTINUED | COMMUNITY
Start: 2018-07-05 | End: 2019-07-18

## 2019-07-18 RX ORDER — EZETIMIBE 10 MG/1
10 TABLET ORAL
Qty: 90 | Refills: 3 | Status: DISCONTINUED | COMMUNITY
Start: 2018-07-19 | End: 2019-07-18

## 2019-07-18 RX ORDER — PANTOPRAZOLE 40 MG/1
40 TABLET, DELAYED RELEASE ORAL
Qty: 90 | Refills: 2 | Status: DISCONTINUED | COMMUNITY
Start: 2018-07-19 | End: 2019-07-18

## 2019-07-18 RX ORDER — PEN NEEDLE, DIABETIC 29 G X1/2"
31G X 8 MM NEEDLE, DISPOSABLE MISCELLANEOUS
Qty: 1 | Refills: 3 | Status: DISCONTINUED | COMMUNITY
Start: 2018-11-28 | End: 2019-07-18

## 2019-07-18 RX ORDER — POLYETHYLENE GLYCOL 3350 17 G/17G
17 POWDER, FOR SOLUTION ORAL DAILY
Qty: 30 | Refills: 2 | Status: DISCONTINUED | COMMUNITY
Start: 2018-08-17 | End: 2019-07-18

## 2019-07-18 RX ORDER — EVOLOCUMAB 140 MG/ML
140 INJECTION, SOLUTION SUBCUTANEOUS
Qty: 1 | Refills: 11 | Status: DISCONTINUED | COMMUNITY
Start: 2018-08-21 | End: 2019-07-18

## 2019-07-18 RX ORDER — DULAGLUTIDE 1.5 MG/.5ML
1.5 INJECTION, SOLUTION SUBCUTANEOUS
Qty: 1 | Refills: 5 | Status: DISCONTINUED | COMMUNITY
Start: 2018-07-19 | End: 2019-07-18

## 2019-07-18 RX ORDER — OMEGA-3/DHA/EPA/FISH OIL 300-1000MG
1000 CAPSULE ORAL DAILY
Qty: 120 | Refills: 5 | Status: DISCONTINUED | COMMUNITY
Start: 2018-10-22 | End: 2019-07-18

## 2019-07-18 RX ORDER — INSULIN LISPRO 100 U/ML
100 INJECTION, SOLUTION SUBCUTANEOUS 3 TIMES DAILY
Qty: 1 | Refills: 3 | Status: DISCONTINUED | COMMUNITY
Start: 2018-11-28 | End: 2019-07-18

## 2019-07-18 RX ORDER — AMMONIUM LACTATE 12 %
12 CREAM (GRAM) TOPICAL TWICE DAILY
Qty: 2 | Refills: 5 | Status: DISCONTINUED | COMMUNITY
Start: 2017-12-03 | End: 2019-07-18

## 2019-07-18 RX ORDER — BLOOD SUGAR DIAGNOSTIC
STRIP MISCELLANEOUS EVERY 8 HOURS
Qty: 1 | Refills: 5 | Status: DISCONTINUED | COMMUNITY
Start: 2017-11-08 | End: 2019-07-18

## 2019-07-18 RX ORDER — LORATADINE 5 MG/5 ML
10 SOLUTION, ORAL ORAL
Qty: 30 | Refills: 0 | Status: DISCONTINUED | COMMUNITY
Start: 2018-10-18 | End: 2019-07-18

## 2019-07-18 RX ORDER — CLOTRIMAZOLE 10 MG/ML
1 SOLUTION TOPICAL
Qty: 1 | Refills: 5 | Status: DISCONTINUED | COMMUNITY
Start: 2018-10-01 | End: 2019-07-18

## 2019-07-18 RX ORDER — INSULIN DEGLUDEC INJECTION 200 U/ML
200 INJECTION, SOLUTION SUBCUTANEOUS DAILY
Qty: 2 | Refills: 3 | Status: DISCONTINUED | COMMUNITY
Start: 2018-03-23 | End: 2019-07-18

## 2019-07-18 NOTE — ADDENDUM
[FreeTextEntry1] : Patients medications adjusted today based on what the patient was able to afford at this time.  He was enrolled in care management for closer follow up.  Medications to be optimized once insurance is optimized

## 2019-07-18 NOTE — REVIEW OF SYSTEMS
[Feeling Tired] : feeling tired [Recent Weight Gain (___ Lbs)] : recent [unfilled] ~Ulb weight gain [Arthralgias] : arthralgias [Negative] : Endocrine

## 2019-07-18 NOTE — PHYSICAL EXAM
[General Appearance - Alert] : alert [General Appearance - In No Acute Distress] : in no acute distress [Sclera] : the sclera and conjunctiva were normal [Extraocular Movements] : extraocular movements were intact [Normal Oral Mucosa] : normal oral mucosa [No Oral Pallor] : no oral pallor [No Oral Cyanosis] : no oral cyanosis [Outer Ear] : the ears and nose were normal in appearance [Oropharynx] : The oropharynx was normal [Neck Appearance] : the appearance of the neck was normal [Auscultation Breath Sounds / Voice Sounds] : lungs were clear to auscultation bilaterally [Heart Rate And Rhythm] : heart rate was normal and rhythm regular [Heart Sounds] : normal S1 and S2 [Heart Sounds Gallop] : no gallops [Murmurs] : no murmurs [Heart Sounds Pericardial Friction Rub] : no pericardial rub [Edema] : there was no peripheral edema [Bowel Sounds] : normal bowel sounds [Abdomen Soft] : soft [Abdomen Tenderness] : non-tender [Abdomen Mass (___ Cm)] : no abdominal mass palpated [Nail Clubbing] : no clubbing  or cyanosis of the fingernails [Skin Color & Pigmentation] : normal skin color and pigmentation [Skin Turgor] : normal skin turgor [] : no rash [Sensation] : the sensory exam was normal to light touch and pinprick [No Focal Deficits] : no focal deficits [Oriented To Time, Place, And Person] : oriented to person, place, and time [Impaired Insight] : insight and judgment were intact [Affect] : the affect was normal

## 2019-07-18 NOTE — HISTORY OF PRESENT ILLNESS
[FreeTextEntry1] : Pt is 69 y/o male hx of HTN, DM, HLD, CKD III, and MI in 2017 w stent placement presenting for three weeks of fatigue and decreased energy. States that he is sleeping well. Despite maintaining his regular physical activity and eating less, he reports weight gain over past few months. Pt does not currently have insurance and wants to optimize his medication regimen within his financial constraints. He also wants to be tested for HIV as he had a condom burst 3 months ago w partner of unknown status.

## 2019-07-18 NOTE — ASSESSMENT
[FreeTextEntry1] : #) type 2 DM\par sugars uncontrolled, optimizing meds today within financial constraints\par Actose increase to 30\par starting glimepiride\par f/u when insurance is renewed\par \par #) HTN\par Continue lisinopril/hctz\par start metoprolol 50\par start  amlodipine 10\par \par #) HO CAD\par aspirin 81mg\par plavix\par no statin due to myositis\par \par #) HO BPH\par on flomax- follow up\par \par #) unprotected sexual encounter\par HIV and Hep B ordered\par XXX\par \par \par GERIATRICS ATTENDING; SEN WITH [facility]\par Complex issue with this patient - lost medicaid and now in process of reapplying; he has seen endo and told them of is ins problems and can only afford $40. today for meds; still expensive medications ordered.\par We went through his acute medical problems and made the best assessment we could working with lower co-pays for some generics at [facility] and ordered meds as listed above; he has a print out list; he is being escorted to the pharmacy to put in the orders.  He understands that he MUST complete the Medicaid application and then return to this clinic where we will re-optimize his prescriptions to cover all of his complex medical issues.

## 2019-07-22 DIAGNOSIS — I10 ESSENTIAL (PRIMARY) HYPERTENSION: ICD-10-CM

## 2019-07-22 DIAGNOSIS — E11.9 TYPE 2 DIABETES MELLITUS WITHOUT COMPLICATIONS: ICD-10-CM

## 2019-07-22 DIAGNOSIS — E78.00 PURE HYPERCHOLESTEROLEMIA, UNSPECIFIED: ICD-10-CM

## 2019-07-22 DIAGNOSIS — I25.10 ATHEROSCLEROTIC HEART DISEASE OF NATIVE CORONARY ARTERY WITHOUT ANGINA PECTORIS: ICD-10-CM

## 2019-07-22 DIAGNOSIS — E78.5 HYPERLIPIDEMIA, UNSPECIFIED: ICD-10-CM

## 2019-07-25 LAB
ALBUMIN SERPL ELPH-MCNC: 4.7 G/DL
ALP BLD-CCNC: 82 U/L
ALT SERPL-CCNC: 20 U/L
ANION GAP SERPL CALC-SCNC: 14 MMOL/L
AST SERPL-CCNC: 23 U/L
BASOPHILS # BLD AUTO: 0.1 K/UL
BASOPHILS NFR BLD AUTO: 1.4 %
BILIRUB SERPL-MCNC: <0.2 MG/DL
BUN SERPL-MCNC: 35 MG/DL
CALCIUM SERPL-MCNC: 10.1 MG/DL
CHLORIDE SERPL-SCNC: 100 MMOL/L
CHOLEST SERPL-MCNC: 346 MG/DL
CHOLEST/HDLC SERPL: 7.2 RATIO
CO2 SERPL-SCNC: 22 MMOL/L
CREAT SERPL-MCNC: 1.8 MG/DL
EOSINOPHIL # BLD AUTO: 0.43 K/UL
EOSINOPHIL NFR BLD AUTO: 5.8 %
ESTIMATED AVERAGE GLUCOSE: 237 MG/DL
GLUCOSE SERPL-MCNC: 110 MG/DL
HBA1C MFR BLD HPLC: 9.9 %
HBV SURFACE AB SER QL: NONREACTIVE
HBV SURFACE AG SER QL: NONREACTIVE
HCT VFR BLD CALC: 43.4 %
HDLC SERPL-MCNC: 48 MG/DL
HGB BLD-MCNC: 14 G/DL
HIV1+2 AB SPEC QL IA.RAPID: NONREACTIVE
IMM GRANULOCYTES NFR BLD AUTO: 0.3 %
LDLC SERPL CALC-MCNC: 266 MG/DL
LYMPHOCYTES # BLD AUTO: 2.46 K/UL
LYMPHOCYTES NFR BLD AUTO: 33.2 %
MAN DIFF?: NORMAL
MCHC RBC-ENTMCNC: 26.4 PG
MCHC RBC-ENTMCNC: 32.3 G/DL
MCV RBC AUTO: 81.7 FL
MONOCYTES # BLD AUTO: 0.59 K/UL
MONOCYTES NFR BLD AUTO: 8 %
NEUTROPHILS # BLD AUTO: 3.8 K/UL
NEUTROPHILS NFR BLD AUTO: 51.3 %
PLATELET # BLD AUTO: 290 K/UL
POTASSIUM SERPL-SCNC: 5.7 MMOL/L
PROT SERPL-MCNC: 8.4 G/DL
RBC # BLD: 5.31 M/UL
RBC # FLD: 14.8 %
SODIUM SERPL-SCNC: 136 MMOL/L
TRIGL SERPL-MCNC: 354 MG/DL
WBC # FLD AUTO: 7.4 K/UL

## 2019-08-13 ENCOUNTER — APPOINTMENT (OUTPATIENT)
Dept: PODIATRY | Facility: CLINIC | Age: 69
End: 2019-08-13
Payer: SUBSIDIZED

## 2019-08-13 ENCOUNTER — OUTPATIENT (OUTPATIENT)
Dept: OUTPATIENT SERVICES | Facility: HOSPITAL | Age: 69
LOS: 1 days | Discharge: HOME | End: 2019-08-13

## 2019-08-13 VITALS
BODY MASS INDEX: 29.62 KG/M2 | HEART RATE: 91 BPM | SYSTOLIC BLOOD PRESSURE: 126 MMHG | DIASTOLIC BLOOD PRESSURE: 74 MMHG | HEIGHT: 69 IN | WEIGHT: 200 LBS

## 2019-08-13 DIAGNOSIS — Z98.890 OTHER SPECIFIED POSTPROCEDURAL STATES: Chronic | ICD-10-CM

## 2019-08-13 PROCEDURE — 99213 OFFICE O/P EST LOW 20 MIN: CPT

## 2019-08-14 NOTE — END OF VISIT
[] : Resident [FreeTextEntry3] : TTP at the N spot left foot. Rx Voltaren gel \par will hold off on corticosteroid injections due to patients A1c level

## 2019-08-14 NOTE — PROCEDURE
[FreeTextEntry1] : -diabetic neurovascular assessment performed\par -Patient instructed to check the bottom of her feet daily. Patient educated on diabetic foot hygiene\par -control sugar\par -rx voltaren gel bid or as needed for kidner foot type pain b/l feet\par -f/u with dr. dial\par -RTC 6 months

## 2019-08-14 NOTE — PHYSICAL EXAM
[General Appearance - Alert] : alert [Full Pulse] : the pedal pulses are present [] : no rash [Skin Lesions] : no skin lesions [Vibration Dec.] : diminished vibratory sensation at the level of the toes [FreeTextEntry1] : anterior cavus foot type [Diminished Throughout Right Foot] : normal tactile sensation with monofilament testing throughout right foot [Diminished Throughout Left Foot] : normal tactile sensation with monofilament testing throughout left foot

## 2019-08-14 NOTE — HISTORY OF PRESENT ILLNESS
[FreeTextEntry1] : A 67 y/o male with Dm2 presents to clinic for b/l feet pain with tingling/numbness distal tips of digits 1-10. he denies any trauma. HgA1c 9.9 (7/25/19)

## 2019-08-22 ENCOUNTER — OUTPATIENT (OUTPATIENT)
Dept: OUTPATIENT SERVICES | Facility: HOSPITAL | Age: 69
LOS: 1 days | Discharge: HOME | End: 2019-08-22

## 2019-08-22 ENCOUNTER — APPOINTMENT (OUTPATIENT)
Dept: GERIATRICS | Facility: CLINIC | Age: 69
End: 2019-08-22
Payer: COMMERCIAL

## 2019-08-22 VITALS
SYSTOLIC BLOOD PRESSURE: 172 MMHG | TEMPERATURE: 96.9 F | HEART RATE: 91 BPM | HEIGHT: 69 IN | BODY MASS INDEX: 28.44 KG/M2 | WEIGHT: 192 LBS | DIASTOLIC BLOOD PRESSURE: 112 MMHG

## 2019-08-22 DIAGNOSIS — Z98.890 OTHER SPECIFIED POSTPROCEDURAL STATES: Chronic | ICD-10-CM

## 2019-08-22 PROCEDURE — 99213 OFFICE O/P EST LOW 20 MIN: CPT | Mod: GC

## 2019-08-22 NOTE — HISTORY OF PRESENT ILLNESS
[FreeTextEntry1] : Patient is a 68 year old male history of HTN, DM, HLD, CKD III, and MI in 2017 w stent placement here today for follow-up. Complaining of epigastric pain for last three weeks that is burning; does not radiate and not associated with meals. Also complaining of "bumps" around his anus that he noticed while wiping; has been present for about one month. States he is not taking a statin due to past issues with myopathy even after switching statins and doses. Has not been taking Protonix because his insurance no longer covers this medication. Ran out of Norvasc, Metoprolol, Glimepride, ASA 4 days before today's appointment. States his glucose readings at home have been elevated ever since he has stopped Trulicity (insurance stopped covering). \par

## 2019-08-22 NOTE — REVIEW OF SYSTEMS
[Constipation] : constipation [Heartburn] : heartburn [Hesitancy] : urinary hesitancy [Depression] : depression [Fever] : no fever [Chills] : no chills [Eye Pain] : no eye pain [Chest Pain] : no chest pain [Palpitations] : no palpitations [Shortness Of Breath] : no shortness of breath [Cough] : no cough [Vomiting] : no vomiting [Diarrhea] : no diarrhea [Dysuria] : no dysuria [Nocturia] : no nocturia [Skin Lesions] : no skin lesions [FreeTextEntry3] : Burning in eyes at times, no vision changes

## 2019-08-22 NOTE — PHYSICAL EXAM
[General Appearance - Alert] : alert [General Appearance - In No Acute Distress] : in no acute distress [General Appearance - Well Nourished] : well nourished [General Appearance - Well Developed] : well developed [PERRL With Normal Accommodation] : pupils were equal in size, round, and reactive to light [Extraocular Movements] : extraocular movements were intact [Normal Oral Mucosa] : normal oral mucosa [Outer Ear] : the ears and nose were normal in appearance [Neck Appearance] : the appearance of the neck was normal [] : no respiratory distress [Respiration, Rhythm And Depth] : normal respiratory rhythm and effort [Auscultation Breath Sounds / Voice Sounds] : lungs were clear to auscultation bilaterally [Heart Rate And Rhythm] : heart rate was normal and rhythm regular [Heart Sounds] : normal S1 and S2 [Bowel Sounds] : normal bowel sounds [Abdomen Soft] : soft [Abdomen Tenderness] : non-tender

## 2019-08-22 NOTE — ASSESSMENT
[FreeTextEntry1] : Patient is a 68 year old male history of HTN, DM, HLD, CKD III, and MI in 2017 w stent placement here today for follow-up\par \par # T2DM\par - Sugars uncontrolled, optimizing meds today within financial constraints\par - On glimepride and pioglitazone\par - F/u when insurance is renewed\par \par # HTN\par - BP uncontrolled today, but patient was not taking medications for 4 days\par - Continue lisinopril/hctz\par - Continue metoprolol 50\par - Continue amlodipine 10\par \par # H/o CAD\par - Continue aspirin 81mg\par - Continue plavix\par - No statin due to myositis, however lipid profile with severely elevated TG & cholesterol\par \par # H/o BPH\par - C/w Flomax\par - Still experiencing hesitancy with urination. No nocturia\par \par # HLD\par - No statins dues to myopathy in past\par - Will try Pravicol if patient can afford medication from [facility]\par \par # Unprotected sexual encounter\par - HIV and Hep B negative\par \par # HCM\par - Medicaid insurance application still unresolved\par - RTC in 3 months with new labs ordered\par - Colonoscopy referral sent. GI referral sent\par \par GERIATRICS ATTENDING; SEEN WITH [facility] TE[facility]\par Issues are the same - he again ran out of medications - did not understand that he had refills or how that works; he now expresses understanding; also c/o rectal lump - no external hemorrhoids on inspection; referring to GI - overdue for exam which had been setup last year but he did not f/u

## 2019-08-23 DIAGNOSIS — E11.9 TYPE 2 DIABETES MELLITUS WITHOUT COMPLICATIONS: ICD-10-CM

## 2019-08-23 DIAGNOSIS — K21.9 GASTRO-ESOPHAGEAL REFLUX DISEASE WITHOUT ESOPHAGITIS: ICD-10-CM

## 2019-08-23 DIAGNOSIS — I25.10 ATHEROSCLEROTIC HEART DISEASE OF NATIVE CORONARY ARTERY WITHOUT ANGINA PECTORIS: ICD-10-CM

## 2019-08-23 DIAGNOSIS — I10 ESSENTIAL (PRIMARY) HYPERTENSION: ICD-10-CM

## 2019-08-23 DIAGNOSIS — I50.9 HEART FAILURE, UNSPECIFIED: ICD-10-CM

## 2019-08-23 DIAGNOSIS — N40.0 BENIGN PROSTATIC HYPERPLASIA WITHOUT LOWER URINARY TRACT SYMPTOMS: ICD-10-CM

## 2019-08-23 DIAGNOSIS — N18.3 CHRONIC KIDNEY DISEASE, STAGE 3 (MODERATE): ICD-10-CM

## 2019-08-23 DIAGNOSIS — E78.5 HYPERLIPIDEMIA, UNSPECIFIED: ICD-10-CM

## 2019-09-26 ENCOUNTER — APPOINTMENT (OUTPATIENT)
Dept: ENDOCRINOLOGY | Facility: CLINIC | Age: 69
End: 2019-09-26

## 2019-09-26 ENCOUNTER — OUTPATIENT (OUTPATIENT)
Dept: OUTPATIENT SERVICES | Facility: HOSPITAL | Age: 69
LOS: 1 days | Discharge: HOME | End: 2019-09-26

## 2019-09-26 VITALS
BODY MASS INDEX: 28.44 KG/M2 | DIASTOLIC BLOOD PRESSURE: 83 MMHG | HEIGHT: 69 IN | SYSTOLIC BLOOD PRESSURE: 143 MMHG | WEIGHT: 192 LBS

## 2019-09-26 DIAGNOSIS — Z98.890 OTHER SPECIFIED POSTPROCEDURAL STATES: Chronic | ICD-10-CM

## 2019-09-26 NOTE — ASSESSMENT
[Diabetes Foot Care] : diabetes foot care [Carbohydrate Consistent Diet] : carbohydrate consistent diet [Long Term Vascular Complications] : long term vascular complications of diabetes [Importance of Diet and Exercise] : importance of diet and exercise to improve glycemic control, achieve weight loss and improve cardiovascular health [FreeTextEntry1] : 68 M here for f/u w/ pmh of dm2, has returned to clinic with insurance coverage. Patient reports being compliant with his medications. \par \par # DM2\par - Supposed to be on Tresiba / Trulicity / Amdelog / & Actos\par - Never took Amdelog, and is not sure if he ever took tresiba\par - Las t A1c 9.9% in 7/19\par - Patient has reacquired insurance coverage (showed a benefit card), and is reporting to be complaint with his medication.

## 2019-09-26 NOTE — PHYSICAL EXAM
[Alert] : alert [No Acute Distress] : no acute distress [Well Nourished] : well nourished [Well Developed] : well developed [Normal Sclera/Conjunctiva] : normal sclera/conjunctiva [EOMI] : extra ocular movement intact [No Proptosis] : no proptosis [Normal Oropharynx] : the oropharynx was normal [Thyroid Not Enlarged] : the thyroid was not enlarged [No Thyroid Nodules] : there were no palpable thyroid nodules [No Respiratory Distress] : no respiratory distress [No Accessory Muscle Use] : no accessory muscle use [Clear to Auscultation] : lungs were clear to auscultation bilaterally [Normal Rate] : heart rate was normal  [Normal S1, S2] : normal S1 and S2 [Regular Rhythm] : with a regular rhythm [Pedal Pulses Normal] : the pedal pulses are present [No Edema] : there was no peripheral edema [Normal Bowel Sounds] : normal bowel sounds [Soft] : abdomen soft [Not Distended] : not distended [No Spinal Tenderness] : no spinal tenderness [Spine Straight] : spine straight [No Tremors] : no tremors [Normal Reflexes] : deep tendon reflexes were 2+ and symmetric [Oriented x3] : oriented to person, place, and time

## 2019-09-26 NOTE — END OF VISIT
[Time Spent: ___ minutes] : I have spent [unfilled] minutes of face to face time with the patient [] : Resident [>50% of Time Spent on Counseling and Coordination of Care for  ___] : Greater than 50% of the encounter time was spent on counseling and coordination of care for [unfilled]

## 2019-09-26 NOTE — HISTORY OF PRESENT ILLNESS
[FreeTextEntry1] : 68 M w/ pmh of DM2, here for follow up. The pt says he has re-acquired insurance and would like to refill his medications. Patient presented current medication bottles with him. Patient has no complaints except chronic foot pain, calls it "muscle pain". Has seen podiatry recently who recommended 6 month follow up as per patient. The patient denied chest pain, sob, nausea, vomiting, diarrhea or other constitutional symptoms.

## 2019-09-26 NOTE — REASON FOR VISIT
[Follow-Up: _____] : a [unfilled] follow-up visit [FreeTextEntry1] : uncontrolled type 2 diabetes, and insurance iisues.

## 2019-09-30 DIAGNOSIS — E66.01 MORBID (SEVERE) OBESITY DUE TO EXCESS CALORIES: ICD-10-CM

## 2019-09-30 DIAGNOSIS — E11.65 TYPE 2 DIABETES MELLITUS WITH HYPERGLYCEMIA: ICD-10-CM

## 2019-09-30 DIAGNOSIS — E78.00 PURE HYPERCHOLESTEROLEMIA, UNSPECIFIED: ICD-10-CM

## 2019-10-04 ENCOUNTER — OUTPATIENT (OUTPATIENT)
Dept: OUTPATIENT SERVICES | Facility: HOSPITAL | Age: 69
LOS: 1 days | Discharge: HOME | End: 2019-10-04

## 2019-10-04 ENCOUNTER — EMERGENCY (EMERGENCY)
Facility: HOSPITAL | Age: 69
LOS: 0 days | Discharge: HOME | End: 2019-10-04
Admitting: EMERGENCY MEDICINE
Payer: MEDICAID

## 2019-10-04 ENCOUNTER — APPOINTMENT (OUTPATIENT)
Dept: GASTROENTEROLOGY | Facility: CLINIC | Age: 69
End: 2019-10-04
Payer: MEDICAID

## 2019-10-04 VITALS
HEIGHT: 69 IN | WEIGHT: 195 LBS | DIASTOLIC BLOOD PRESSURE: 89 MMHG | SYSTOLIC BLOOD PRESSURE: 152 MMHG | HEART RATE: 75 BPM | BODY MASS INDEX: 28.88 KG/M2

## 2019-10-04 VITALS
HEART RATE: 75 BPM | DIASTOLIC BLOOD PRESSURE: 92 MMHG | SYSTOLIC BLOOD PRESSURE: 178 MMHG | RESPIRATION RATE: 18 BRPM | OXYGEN SATURATION: 98 % | TEMPERATURE: 97 F

## 2019-10-04 DIAGNOSIS — Z98.890 OTHER SPECIFIED POSTPROCEDURAL STATES: Chronic | ICD-10-CM
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
## 2019-10-04 DIAGNOSIS — K63.5 POLYP OF COLON: ICD-10-CM

## 2019-10-04 DIAGNOSIS — M25.572 PAIN IN LEFT ANKLE AND JOINTS OF LEFT FOOT: ICD-10-CM

## 2019-10-04 DIAGNOSIS — Z87.891 PERSONAL HISTORY OF NICOTINE DEPENDENCE: ICD-10-CM

## 2019-10-04 DIAGNOSIS — Z98.890 OTHER SPECIFIED POSTPROCEDURAL STATES: ICD-10-CM

## 2019-10-04 DIAGNOSIS — M79.673 PAIN IN UNSPECIFIED FOOT: ICD-10-CM
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
## 2019-10-04 PROCEDURE — 73610 X-RAY EXAM OF ANKLE: CPT | Mod: 26,LT

## 2019-10-04 PROCEDURE — 29515 APPLICATION SHORT LEG SPLINT: CPT

## 2019-10-04 PROCEDURE — 99283 EMERGENCY DEPT VISIT LOW MDM: CPT | Mod: 25

## 2019-10-04 PROCEDURE — 99214 OFFICE O/P EST MOD 30 MIN: CPT

## 2019-10-04 RX ORDER — IBUPROFEN 200 MG
600 TABLET ORAL ONCE
Refills: 0 | Status: COMPLETED | OUTPATIENT
Start: 2019-10-04 | End: 2019-10-04

## 2019-10-04 RX ADMIN — Medication 600 MILLIGRAM(S): at 15:56

## 2019-10-04 NOTE — ASSESSMENT
[FreeTextEntry1] :  \par Assessment\par \par 68 yo M, PMH DM, CAD s/p MI with PCI Aug 2017 with a GEORGE ( ASA and Plavix ) placed in the mid RCA, DLD, HTN, CKD stage 3 sent by his PMD for Screening colonoscopy.\par \par - No alarm symptoms reported.\par -TSH wnl.\par - colonoscopy and prep ordered\par - Follow up in GI clinic in 2 months.

## 2019-10-04 NOTE — ED PROVIDER NOTE - NSFOLLOWUPINSTRUCTIONS_ED_ALL_ED_FT
Splint Care  Splints are supports that are worn to protect broken bones and other injuries. A cast or splint may hold a bone still and in the correct position while it heals. Casts and splints may also help ease pain, swelling, and muscle spasms.    A splint is a type of soft support that is usually made from cloth and elastic. It can be adjusted or taken off as needed.    You may need a cast or a splint if you:  Have a broken bone.  Have a soft-tissue injury.  Need to keep an injured body part from moving (keep it immobile) after surgery.    Do not stick anything inside the cast to scratch the skin. Sticking something in the cast increases your risk of infection.  Check the skin around the cast every day. Tell your health care provider about any concerns.  You may put lotion on dry skin around the edges of the cast. Do not put lotion on the skin underneath the cast.  Keep the cast clean.  If the cast is not waterproof:  Do not let it get wet.  Cover it with a watertight covering when you take a bath or a shower.    Wear it as told by your health care provider. Remove it only as told by your health care provider.  Loosen the splint if your fingers or toes tingle, become numb, or turn cold and blue.  Keep the splint clean.  If the splint is not waterproof:  Do not let it get wet.  Cover it with a watertight covering when you take a bath or a shower.  Follow these instructions at home:    Move your fingers or toes often to avoid stiffness and to lessen swelling.  Raise (elevate) the injured area above the level of your heart while sitting or lying down.  Safety     Do not use the injured limb to support your body weight until your health care provider says that it is okay.  Use crutches or other assistive devices as told by your health care provider.  General instructions     Take over-the-counter and prescription medicines only as told by your health care provider.    Contact a health care provider if:  Your cast or splint gets damaged.  Your skin under or around the cast becomes red or raw.  Your skin under the cast is extremely itchy or painful.  Your cast or splint feels very uncomfortable.  Your cast or splint is too tight or too loose.  Your cast becomes wet or it develops a soft spot or area.  You get an object stuck under the cast.  Get help right away if:  Your pain is getting worse.  Your injured area tingles, becomes numb, or turns cold and blue.  The part of your body above or below the cast is swollen or discolored.  You cannot feel or move your fingers or toes.  There is fluid leaking through the cast.  You have severe pain or pressure under the cast.  This information is not intended to replace advice given to you by your health care provider. Make sure you discuss any questions you have with your health care provider.

## 2019-10-04 NOTE — HISTORY OF PRESENT ILLNESS
[de-identified] : GI History: 68 yo M, PMH DM, CAD s/p MI with PCI Aug 2017 with a GEORGE ( ASA and Plavix ) placed in the mid RCA, DLD, HTN, CKD stage 3 sent by his PMD for Screening colonoscopy.\santiago Pt had colonoscopy done 3 times in the past, last one was 3 yrs ago in [place] and had 2 polyps removed. \santiago Pt does c/o occasional constipation describes as hard stools and has never tried any medication before. Pt denies any rectal bleeding, changes in weight, appetite.\santiago Pt denies GERD, dyspepsia, dysphagia or odynophagia. \santiago Pt's cardiologist is Dr Shell and he approved the patient to be off asa or plavix for required time. patient missed his colonoscopy but will make sure to make it to the next one to be scheduled.

## 2019-10-04 NOTE — ED PROVIDER NOTE - NSFOLLOWUPCLINICS_GEN_ALL_ED_FT
[id number]  Orthpedic  [address], [place]   Phone: [phone]  Fax:   Follow Up Time:

## 2019-10-04 NOTE — ASSESSMENT
[FreeTextEntry1] : \par 66 yo M, PMH DM, CAD s/p MI with PCI Aug 2017 with a GEORGE ( ASA and Plavix ) placed in the mid RCA, DLD, HTN, CKD stage 3 sent by his PMD for Screening colonoscopy.\par \par - No alarm symptoms reported.\par - TSH wnl\par - Will schedule for colonoscopy , patient missed last scheduled colonoscopy\par - Follow up in GI clinic in 2 months. \par

## 2019-10-04 NOTE — ASSESSMENT
[FreeTextEntry1] : \par 68 yo M, PMH DM, CAD s/p MI with PCI Aug 2017 with a GEORGE ( ASA and Plavix ) placed in the mid RCA, DLD, HTN, CKD stage 3 sent by his PMD for Screening colonoscopy.\par \par - No alarm symptoms reported.\par - TSH wnl\par - Will schedule for colonoscopy , patient missed last scheduled colonoscopy\par - Follow up in GI clinic in 2 months. \par

## 2019-10-04 NOTE — PHYSICAL EXAM
[General Appearance - Alert] : alert [General Appearance - In No Acute Distress] : in no acute distress [PERRL With Normal Accommodation] : pupils were equal in size, round, and reactive to light [Sclera] : the sclera and conjunctiva were normal [Extraocular Movements] : extraocular movements were intact [Outer Ear] : the ears and nose were normal in appearance [Oropharynx] : the oropharynx was normal [Neck Appearance] : the appearance of the neck was normal [Neck Cervical Mass (___cm)] : no neck mass was observed [Jugular Venous Distention Increased] : there was no jugular-venous distention [Thyroid Diffuse Enlargement] : the thyroid was not enlarged [Thyroid Nodule] : there were no palpable thyroid nodules [Heart Rate And Rhythm] : heart rate was normal and rhythm regular [Auscultation Breath Sounds / Voice Sounds] : lungs were clear to auscultation bilaterally [Murmurs] : no murmurs [Heart Sounds Gallop] : no gallops [Heart Sounds] : normal S1 and S2 [Heart Sounds Pericardial Friction Rub] : no pericardial rub [Full Pulse] : the pedal pulses are present [Edema] : there was no peripheral edema [Bowel Sounds] : normal bowel sounds [Abdomen Tenderness] : non-tender [Abdomen Soft] : soft [Abdomen Mass (___ Cm)] : no abdominal mass palpated [No CVA Tenderness] : no ~M costovertebral angle tenderness [No Spinal Tenderness] : no spinal tenderness [Abnormal Walk] : normal gait [Nail Clubbing] : no clubbing  or cyanosis of the fingernails [Musculoskeletal - Swelling] : no joint swelling seen [Motor Tone] : muscle strength and tone were normal [] : no rash [Skin Color & Pigmentation] : normal skin color and pigmentation [Skin Turgor] : normal skin turgor [Sensation] : the sensory exam was normal to light touch and pinprick [Deep Tendon Reflexes (DTR)] : deep tendon reflexes were 2+ and symmetric [Oriented To Time, Place, And Person] : oriented to person, place, and time [No Focal Deficits] : no focal deficits [Impaired Insight] : insight and judgment were intact [Affect] : the affect was normal

## 2019-10-04 NOTE — ED PROVIDER NOTE - OBJECTIVE STATEMENT
pt c/o left ankle pain for approx 2 weeks post ?inversion injury  pain is sharp, nonradiating, moderate  denies exacerbating or relieving factors  Denies fever/chill/HA/dizziness/chest pain/palpitation/sob/abd pain/n/v/d/ black stool/bloody stool/urinary sxs

## 2019-10-04 NOTE — HISTORY OF PRESENT ILLNESS
[de-identified] :  66 yo M, PMH DM, CAD s/p MI with PCI Aug 2017 with a GEORGE ( ASA and Plavix ) placed in the mid RCA, DLD, HTN, CKD stage 3 sent by his PMD for Screening colonoscopy.\santiago Pt had colonoscopy done 3 times in the past, last one was 3 yrs ago in [place] and had 2 polyps removed. \santiago Pt does c/o occasional constipation describes as hard stools and has never tried any medication before. Pt denies any rectal bleeding, changes in weight, appetite.\par Pt denies GERD, dyspepsia, dysphagia or odynophagia. \santiago Pt's cardiologist is Dr Shell and gave him permission to hold plavix for 5 days prior. \par

## 2019-10-04 NOTE — ED PROVIDER NOTE - PATIENT PORTAL LINK FT
You can access the FollowMyHealth Patient Portal offered by [facility] by registering at the following website: [email or web address]. By joining [employer]’s FollowMyHealth portal, you will also be able to view your health information using other applications (apps) compatible with our system.

## 2019-10-04 NOTE — HISTORY OF PRESENT ILLNESS
[de-identified] :  66 yo M, PMH DM, CAD s/p MI with PCI Aug 2017 with a GEORGE ( ASA and Plavix ) placed in the mid RCA, DLD, HTN, CKD stage 3 sent by his PMD for Screening colonoscopy.\santiago Pt had colonoscopy done 3 times in the past, last one was 3 yrs ago in [place] and had 2 polyps removed. \santiago Pt does c/o occasional constipation describes as hard stools and has never tried any medication before. Pt denies any rectal bleeding, changes in weight, appetite.\par Pt denies GERD, dyspepsia, dysphagia or odynophagia. \santiago Pt's cardiologist is Dr Shell and gave him permission to hold plavix for 5 days prior. \par

## 2019-10-04 NOTE — ED PROVIDER NOTE - PHYSICAL EXAMINATION
CONSTITUTIONAL: Well-appearing; well-nourished; in no apparent distress.   MS: left ankle lateral ttp and pain with rom, pain with ambulation/bearing weight; otherwise normal ROM in all other joints/extremities; non-tender to palpation; distal pulses are normal.   SKIN: Normal for age and race; warm; dry; good turgor; no apparent lesions or exudate.   NEURO/PSYCH: A & O x 4; grossly unremarkable. mood and manner are appropriate. Grooming and personal hygiene are appropriate. No apparent thoughts of harm to self or others.

## 2019-10-04 NOTE — ED PROCEDURE NOTE - NS ED PROC PERFORMED BY1 FT
[facility] rec'd via fax. Form in your mailbox to be signed.     roger

## 2019-10-04 NOTE — PHYSICAL EXAM
[General Appearance - Alert] : alert [General Appearance - In No Acute Distress] : in no acute distress [Sclera] : the sclera and conjunctiva were normal [PERRL With Normal Accommodation] : pupils were equal in size, round, and reactive to light [Extraocular Movements] : extraocular movements were intact [Outer Ear] : the ears and nose were normal in appearance [Oropharynx] : the oropharynx was normal [Neck Appearance] : the appearance of the neck was normal [Neck Cervical Mass (___cm)] : no neck mass was observed [Jugular Venous Distention Increased] : there was no jugular-venous distention [Thyroid Diffuse Enlargement] : the thyroid was not enlarged [Thyroid Nodule] : there were no palpable thyroid nodules [Auscultation Breath Sounds / Voice Sounds] : lungs were clear to auscultation bilaterally [Heart Rate And Rhythm] : heart rate was normal and rhythm regular [Heart Sounds] : normal S1 and S2 [Heart Sounds Gallop] : no gallops [Murmurs] : no murmurs [Edema] : there was no peripheral edema [Full Pulse] : the pedal pulses are present [Heart Sounds Pericardial Friction Rub] : no pericardial rub [Penis Abnormality] : the penis was normal [Testes Swelling] : the testicles were not swollen [Scrotum] : the scrotum was normal [Cervical Lymph Nodes Enlarged Posterior Bilaterally] : posterior cervical [Testes Mass (___cm)] : there were no testicular masses [Cervical Lymph Nodes Enlarged Anterior Bilaterally] : anterior cervical [Supraclavicular Lymph Nodes Enlarged Bilaterally] : supraclavicular [Axillary Lymph Nodes Enlarged Bilaterally] : axillary [Inguinal Lymph Nodes Enlarged Bilaterally] : inguinal [Femoral Lymph Nodes Enlarged Bilaterally] : femoral [No Spinal Tenderness] : no spinal tenderness [No CVA Tenderness] : no ~M costovertebral angle tenderness [Abnormal Walk] : normal gait [Nail Clubbing] : no clubbing  or cyanosis of the fingernails [Musculoskeletal - Swelling] : no joint swelling seen [Motor Tone] : muscle strength and tone were normal [Skin Color & Pigmentation] : normal skin color and pigmentation [Skin Turgor] : normal skin turgor [] : no rash [Deep Tendon Reflexes (DTR)] : deep tendon reflexes were 2+ and symmetric [Sensation] : the sensory exam was normal to light touch and pinprick [No Focal Deficits] : no focal deficits [Oriented To Time, Place, And Person] : oriented to person, place, and time [Impaired Insight] : insight and judgment were intact [Affect] : the affect was normal

## 2019-10-16 ENCOUNTER — OUTPATIENT (OUTPATIENT)
Dept: OUTPATIENT SERVICES | Facility: HOSPITAL | Age: 69
LOS: 1 days | Discharge: HOME | End: 2019-10-16

## 2019-10-16 ENCOUNTER — APPOINTMENT (OUTPATIENT)
Dept: ORTHOPEDIC SURGERY | Facility: CLINIC | Age: 69
End: 2019-10-16

## 2019-10-16 DIAGNOSIS — G89.29 PAIN IN RIGHT FOOT: ICD-10-CM

## 2019-10-16 DIAGNOSIS — Z98.890 OTHER SPECIFIED POSTPROCEDURAL STATES: Chronic | ICD-10-CM

## 2019-10-16 DIAGNOSIS — M79.671 PAIN IN RIGHT FOOT: ICD-10-CM

## 2019-10-30 ENCOUNTER — FORM ENCOUNTER (OUTPATIENT)
Age: 69
End: 2019-10-30

## 2019-10-31 ENCOUNTER — APPOINTMENT (OUTPATIENT)
Dept: GERIATRICS | Facility: CLINIC | Age: 69
End: 2019-10-31
Payer: MEDICAID

## 2019-10-31 ENCOUNTER — OUTPATIENT (OUTPATIENT)
Dept: OUTPATIENT SERVICES | Facility: HOSPITAL | Age: 69
LOS: 1 days | Discharge: HOME | End: 2019-10-31
Payer: MEDICAID

## 2019-10-31 ENCOUNTER — OUTPATIENT (OUTPATIENT)
Dept: OUTPATIENT SERVICES | Facility: HOSPITAL | Age: 69
LOS: 1 days | Discharge: HOME | End: 2019-10-31

## 2019-10-31 VITALS
TEMPERATURE: 97.4 F | HEIGHT: 69 IN | HEART RATE: 77 BPM | WEIGHT: 197 LBS | SYSTOLIC BLOOD PRESSURE: 145 MMHG | BODY MASS INDEX: 29.18 KG/M2 | DIASTOLIC BLOOD PRESSURE: 78 MMHG

## 2019-10-31 DIAGNOSIS — K08.89 OTHER SPECIFIED DISORDERS OF TEETH AND SUPPORTING STRUCTURES: ICD-10-CM

## 2019-10-31 DIAGNOSIS — Z86.39 PERSONAL HISTORY OF OTHER ENDOCRINE, NUTRITIONAL AND METABOLIC DISEASE: ICD-10-CM

## 2019-10-31 DIAGNOSIS — I25.10 ATHEROSCLEROTIC HEART DISEASE OF NATIVE CORONARY ARTERY WITHOUT ANGINA PECTORIS: ICD-10-CM

## 2019-10-31 DIAGNOSIS — Z86.79 PERSONAL HISTORY OF OTHER DISEASES OF THE CIRCULATORY SYSTEM: ICD-10-CM

## 2019-10-31 DIAGNOSIS — Z98.890 OTHER SPECIFIED POSTPROCEDURAL STATES: Chronic | ICD-10-CM

## 2019-10-31 DIAGNOSIS — Z87.448 PERSONAL HISTORY OF OTHER DISEASES OF URINARY SYSTEM: ICD-10-CM

## 2019-10-31 DIAGNOSIS — M79.675 PAIN IN LEFT LEG: ICD-10-CM

## 2019-10-31 DIAGNOSIS — M79.605 PAIN IN LEFT LEG: ICD-10-CM

## 2019-10-31 PROCEDURE — 72040 X-RAY EXAM NECK SPINE 2-3 VW: CPT | Mod: 26

## 2019-10-31 PROCEDURE — 99214 OFFICE O/P EST MOD 30 MIN: CPT | Mod: GC

## 2019-10-31 RX ORDER — GLIMEPIRIDE 4 MG/1
4 TABLET ORAL DAILY
Qty: 30 | Refills: 0 | Status: COMPLETED | COMMUNITY
Start: 2019-07-18 | End: 2019-10-31

## 2019-10-31 RX ORDER — PRAVASTATIN SODIUM 20 MG/1
20 TABLET ORAL
Qty: 1 | Refills: 3 | Status: COMPLETED | COMMUNITY
Start: 2019-08-22 | End: 2019-10-31

## 2019-10-31 RX ORDER — PIOGLITAZONE HYDROCHLORIDE 15 MG/1
15 TABLET ORAL
Qty: 90 | Refills: 3 | Status: COMPLETED | COMMUNITY
Start: 2017-11-29 | End: 2019-10-31

## 2019-10-31 RX ORDER — DICLOFENAC SODIUM 10 MG/G
1 GEL TOPICAL DAILY
Qty: 1 | Refills: 2 | Status: COMPLETED | COMMUNITY
Start: 2019-08-13 | End: 2019-10-31

## 2019-10-31 NOTE — HISTORY OF PRESENT ILLNESS
[0] : 2) Feeling down, depressed, or hopeless: Not at all [FreeTextEntry1] : 68 year old male history of HTN, DM, HLD, CKD III, and MI in 2017 w stent placement here today for follow-up. Patient presents with complaint of right foot pain, specifically of the left first toe.  The pain is 10/10 at its worst and associated with swelling and warmness.  He explains that he has taken pain Meds for the pain and it has improved but he does not recall the name.  He has follow up with his podiatrist, ophthalmologist, and plans to undergo his colonoscopy later on this year.  No other positive ros at this time.  \par

## 2019-10-31 NOTE — ASSESSMENT
[Daily physical exercise as tolerated] : Daily physical exercise as tolerated [Medication Management] : medication management [FreeTextEntry1] : 68 year old male history of HTN, DM, HLD, CKD III, and MI in 2017 w stent placement here today for follow-up\par \par # T2DM\par - Sugars uncontrolled, last a1c 9.9, will repeat\par - On glimepride and pioglitazone, will dc glimeperide at this time, continue with insulin  and Trulicity\par - last visit issue with medications secondary to pricing of meds, now resolved the issue, will prescribe insulin as mentioned\par - referral for endo given\par \par # HTN\par - BP controlled today\par - Continue lisinopril/hctz\par - Continue metoprolol 50\par - Continue amlodipine 10\par \par # H/o CAD\par - Continue aspirin 81mg\par - Continue plavix\par \par # H/o BPH\par - C/w Flomax\par \par # HLD\par - No statins due to myopathy in past\par - switched to provocal last visit however did not tolerate\par - will give coq10 for now and represcribed statin on later visit as patient lipids uncontrolled.\par \par # Pain of left foot and right toe\par - prednisone 20mg daily for 3 days prescribed\par \par # tingling and pain of the left upper extremity\par - cervical xr prescribed\par \par # HCM\par - Medicaid insurance application now resolved\par - RTC in 3 months with repeat labs urged for patient to complete\par - Colonoscopy referral sent. GI referral sent last visit in july, now scheduled for colonoscopy.

## 2019-11-01 ENCOUNTER — OUTPATIENT (OUTPATIENT)
Dept: OUTPATIENT SERVICES | Facility: HOSPITAL | Age: 69
LOS: 1 days | End: 2019-11-01

## 2019-11-01 DIAGNOSIS — Z98.890 OTHER SPECIFIED POSTPROCEDURAL STATES: Chronic | ICD-10-CM

## 2019-11-04 DIAGNOSIS — Z86.39 PERSONAL HISTORY OF OTHER ENDOCRINE, NUTRITIONAL AND METABOLIC DISEASE: ICD-10-CM

## 2019-11-04 DIAGNOSIS — K08.89 OTHER SPECIFIED DISORDERS OF TEETH AND SUPPORTING STRUCTURES: ICD-10-CM

## 2019-11-04 DIAGNOSIS — Z86.79 PERSONAL HISTORY OF OTHER DISEASES OF THE CIRCULATORY SYSTEM: ICD-10-CM

## 2019-11-04 DIAGNOSIS — Z87.448 PERSONAL HISTORY OF OTHER DISEASES OF URINARY SYSTEM: ICD-10-CM

## 2019-11-04 DIAGNOSIS — M79.605 PAIN IN LEFT LEG: ICD-10-CM

## 2019-11-07 ENCOUNTER — OUTPATIENT (OUTPATIENT)
Dept: OUTPATIENT SERVICES | Facility: HOSPITAL | Age: 69
LOS: 1 days | Discharge: HOME | End: 2019-11-07
Payer: MEDICARE

## 2019-11-07 ENCOUNTER — EMERGENCY (EMERGENCY)
Facility: HOSPITAL | Age: 69
LOS: 0 days | Discharge: HOME | End: 2019-11-07
Admitting: EMERGENCY MEDICINE
Payer: MEDICAID

## 2019-11-07 VITALS
TEMPERATURE: 97 F | SYSTOLIC BLOOD PRESSURE: 136 MMHG | HEART RATE: 79 BPM | OXYGEN SATURATION: 100 % | RESPIRATION RATE: 18 BRPM | DIASTOLIC BLOOD PRESSURE: 65 MMHG

## 2019-11-07 DIAGNOSIS — K08.89 OTHER SPECIFIED DISORDERS OF TEETH AND SUPPORTING STRUCTURES: ICD-10-CM

## 2019-11-07 DIAGNOSIS — Z98.890 OTHER SPECIFIED POSTPROCEDURAL STATES: Chronic | ICD-10-CM

## 2019-11-07 PROCEDURE — 99282 EMERGENCY DEPT VISIT SF MDM: CPT

## 2019-11-07 PROCEDURE — 99213 OFFICE O/P EST LOW 20 MIN: CPT

## 2019-11-07 NOTE — CONSULT NOTE ADULT - SUBJECTIVE AND OBJECTIVE BOX
Onset: Few weeks    Location / description: Chron's disease and is having severe diarrhea.  No other symptoms presently, just diarrhea.  States 6 stools today.      Precipitating Factors: Chron's    Pain Scale (1 - 10), 10 highest: Not Rated    Associated Symptoms: NA    What improves/worsens symptoms: NA    Symptom specific medications: NA    LMP : No LMP recorded. Patient is postmenopausal.    Recent Care: 6/13/18 Chiropractor    Appointment scheduled for patient 6/14/18 3:45 pm.    Encouraged fluids and to call back with worsening symptoms.  Patient verbalized understanding.        Reason for Disposition  • Age > 70 years    Protocols used: [id number]-[id number]       69 year old male presents to dental clinic with chief concern of right side pain in the upper arch. Some pain in lower right as well.    Med HX: on file - HTN, diabetes, high cholesterol  Meds: patient unsure of what medications he takes  Allergies: NKDA    EOE: WNL  IOE: severe decay on tooth #3, abutment for partial denture. Erythema and slight gingival swelling generalized in maxillary arch, similar gingival presentation in lower arch    Xrays: 2 periapicals taken: #3 and lower right (edentulous area). #3 has severe decay through the furcation, non restorable, and is in the sinus. NSF in lower right. Likely referred pain from #3.    Tooth #3 requires EXT with OS. Non restorable. Patient informed. Patient understands. Will also likely need new partial dentures fabricated. Recommend appt with OS followed by hygiene / exam / treatment planning. Patient agrees.    RX: Amoxicillin 500mg 1q8 / 7 days and ibuprofen 600mg 1q6 / 3 days.

## 2019-11-07 NOTE — ED ADULT TRIAGE NOTE - CCCP TRG CHIEF CMPLNT
"Subjective:       Patient ID: Venkatesh Pritchett is a 38 y.o. female.    Vitals:  height is 5' 4" (1.626 m) and weight is 72.6 kg (160 lb). Her temperature is 97.6 °F (36.4 °C). Her blood pressure is 111/74 and her pulse is 85. Her oxygen saturation is 99%.     Chief Complaint: Shoulder Pain (LEFT); Chest Pain; and Dizziness    Shoulder Pain    The pain is present in the left shoulder and left arm. This is a new problem. The current episode started yesterday. There has been no history of extremity trauma. The problem occurs intermittently. The problem has been unchanged. The quality of the pain is described as aching and sharp. The pain is at a severity of 4/10. The pain is moderate. Associated symptoms include headaches. The symptoms are aggravated by activity. She has tried acetaminophen for the symptoms. The treatment provided mild relief.   Chest Pain    This is a new problem. The current episode started yesterday. The onset quality is sudden. The problem occurs intermittently. The problem has been unchanged. The pain is present in the lateral region. The pain is at a severity of 4/10. The pain is moderate. The quality of the pain is described as pressure and sharp. The pain radiates to the left shoulder and left arm. Associated symptoms include dizziness, headaches, irregular heartbeat and palpitations. She has tried acetaminophen for the symptoms. The treatment provided mild relief.   Dizziness:   Chronicity:  New  Onset:  Yesterday  Progression since onset:  Unchanged  Frequency:  Constantly  Severity:  Moderate  Duration:  Off/on all day  Dizziness characteristics:  Off-balance and lightheaded/impending faint  Frequency of Spells:  Hourly   Associated symptoms: headaches, palpitations and chest pain.  Aggravated by:  Bending, getting up and position changes  Treatments tried:  Rest  Improvements on treatment:  Mild    Review of Systems   HENT: Positive for headaches.    Cardiovascular: Positive for chest pain, " dental pain/injury irregular heartbeat and palpitations.   Neurological: Positive for dizziness.       Objective:      Physical Exam   Constitutional: She is oriented to person, place, and time. She appears well-developed and well-nourished. She is cooperative.  Non-toxic appearance. She does not appear ill. No distress.   HENT:   Head: Normocephalic and atraumatic.   Right Ear: Hearing, tympanic membrane, external ear and ear canal normal.   Left Ear: Hearing, tympanic membrane, external ear and ear canal normal.   Nose: Nose normal. No mucosal edema, rhinorrhea or nasal deformity. No epistaxis. Right sinus exhibits no maxillary sinus tenderness and no frontal sinus tenderness. Left sinus exhibits no maxillary sinus tenderness and no frontal sinus tenderness.   Mouth/Throat: Uvula is midline, oropharynx is clear and moist and mucous membranes are normal. No trismus in the jaw. Normal dentition. No uvula swelling. No posterior oropharyngeal erythema.   Eyes: Conjunctivae and lids are normal. Right eye exhibits no discharge. Left eye exhibits no discharge. No scleral icterus.   Sclera clear bilat   Neck: Trachea normal, normal range of motion, full passive range of motion without pain and phonation normal. Neck supple.   Cardiovascular: Normal rate, regular rhythm, normal heart sounds, intact distal pulses and normal pulses.    Pulmonary/Chest: Effort normal and breath sounds normal. No respiratory distress.   Abdominal: Soft. Normal appearance and bowel sounds are normal. She exhibits no distension, no pulsatile midline mass and no mass. There is no tenderness.   Musculoskeletal: Normal range of motion. She exhibits no edema or deformity.        Arms:  Left pectoralis muscle TTP and pain upon left shoulder extension   Neurological: She is alert and oriented to person, place, and time. She exhibits normal muscle tone. Coordination normal.   Skin: Skin is warm, dry and intact. She is not diaphoretic. No pallor.   Psychiatric: She has a  normal mood and affect. Her speech is normal and behavior is normal. Judgment and thought content normal. Cognition and memory are normal.   Nursing note and vitals reviewed.      Assessment:       1. Strain of left pectoralis muscle, initial encounter        Plan:         Strain of left pectoralis muscle, initial encounter  -     ibuprofen (ADVIL,MOTRIN) 400 MG tablet; Take 1 tablet (400 mg total) by mouth every 6 (six) hours as needed.  Dispense: 20 tablet; Refill: 0      Follow up with PCP/Specialist if not any better as discussed. To ER of [facility] for any worsening of symptoms.  Patient/Guardian voiced understanding and agreement.

## 2019-11-07 NOTE — ED PROVIDER NOTE - CLINICAL SUMMARY MEDICAL DECISION MAKING FREE TEXT BOX
68yo male with upper and lower dental tenderness. No jaw swelling. Stable for dental clinic transfer

## 2019-11-07 NOTE — ED PROVIDER NOTE - PHYSICAL EXAMINATION
CONST: Well appearing in NAD  EYES: PERRL, EOMI, Sclera and conjunctiva clear.   ENT: Upper partial denture, tenderness overlying #1 and tenderness to #31. No maxillary or mandibular swelling  MS: Normal ROM in all extremities. No midline spinal tenderness.  SKIN: Warm, dry, no acute rashes. Good turgor

## 2019-11-07 NOTE — ED PROVIDER NOTE - OBJECTIVE STATEMENT
70yo male with PMHx DM, high cholesterol, HTN presents c/o L upper/lower dental pain x 1 week, constant, without radiation. No aggravating or alleviating factors. Patient reports he has partial upper and lower dentures and reports pain at site of where they attach. No jaw swelling, trismus, fever or chills.

## 2019-11-08 DIAGNOSIS — Z71.89 OTHER SPECIFIED COUNSELING: ICD-10-CM

## 2019-11-11 DIAGNOSIS — H52.223 REGULAR ASTIGMATISM, BILATERAL: ICD-10-CM

## 2019-11-11 DIAGNOSIS — H52.4 PRESBYOPIA: ICD-10-CM

## 2019-11-11 DIAGNOSIS — H18.9 UNSPECIFIED DISORDER OF CORNEA: ICD-10-CM

## 2019-11-11 DIAGNOSIS — H04.123 DRY EYE SYNDROME OF BILATERAL LACRIMAL GLANDS: ICD-10-CM

## 2019-11-11 RX ORDER — GLUCOSA SU 2KCL/CHONDROITIN SU 500-400 MG
100 CAPSULE ORAL
Qty: 30 | Refills: 3 | Status: DISCONTINUED | COMMUNITY
Start: 2019-10-31 | End: 2019-11-11

## 2019-11-20 ENCOUNTER — EMERGENCY (EMERGENCY)
Facility: HOSPITAL | Age: 69
LOS: 0 days | Discharge: HOME | End: 2019-11-20
Admitting: EMERGENCY MEDICINE
Payer: MEDICAID

## 2019-11-20 VITALS
TEMPERATURE: 100 F | DIASTOLIC BLOOD PRESSURE: 70 MMHG | SYSTOLIC BLOOD PRESSURE: 145 MMHG | HEART RATE: 94 BPM | RESPIRATION RATE: 18 BRPM | OXYGEN SATURATION: 100 %

## 2019-11-20 DIAGNOSIS — Z98.890 OTHER SPECIFIED POSTPROCEDURAL STATES: Chronic | ICD-10-CM

## 2019-11-20 DIAGNOSIS — R05 COUGH: ICD-10-CM

## 2019-11-20 DIAGNOSIS — B34.9 VIRAL INFECTION, UNSPECIFIED: ICD-10-CM

## 2019-11-20 PROCEDURE — 99283 EMERGENCY DEPT VISIT LOW MDM: CPT

## 2019-11-20 PROCEDURE — 71046 X-RAY EXAM CHEST 2 VIEWS: CPT | Mod: 26

## 2019-11-20 RX ORDER — AZITHROMYCIN 500 MG/1
1 TABLET, FILM COATED ORAL
Qty: 1 | Refills: 0
Start: 2019-11-20 | End: 2019-11-24

## 2019-11-20 RX ORDER — ACETAMINOPHEN 500 MG
650 TABLET ORAL ONCE
Refills: 0 | Status: COMPLETED | OUTPATIENT
Start: 2019-11-20 | End: 2019-11-20

## 2019-11-20 RX ADMIN — Medication 650 MILLIGRAM(S): at 15:43

## 2019-11-20 NOTE — ED PROVIDER NOTE - OBJECTIVE STATEMENT
69 y.o. male pmh MI (2 years ago) with stent placement, DM2 , HTN, HLD, presenting with cough of 1 week duration. Pt endorses myalgias, productive cough ( yellow sputum), chills, nasal congestion and rhinorrhea. Denies nausea, vomiting, sore throat, dysphagia, hoarseness, CP, SOB, [place], palpitations, pleurisy, diarrhea, recent travel or calf tenderness. Reports taking Tylenol PO x3 per day since Wednesday for myalgias, has provided some relief. Received influenza vaccination 2 weeks ago.

## 2019-11-20 NOTE — ED PROVIDER NOTE - PATIENT PORTAL LINK FT
You can access the FollowMyHealth Patient Portal offered by [facility] by registering at the following website: [email or web address]. By joining [employer]’s FollowMyHealth portal, you will also be able to view your health information using other applications (apps) compatible with our system.

## 2019-11-20 NOTE — ED PROVIDER NOTE - NSFOLLOWUPINSTRUCTIONS_ED_ALL_ED_FT
Follow up with your primary medical doctor in 1-2 days    Viral Illness, Adult  Viruses are tiny germs that can get into a person's body and cause illness. There are many different types of viruses, and they cause many types of illness. Viral illnesses can range from mild to severe. They can affect various parts of the body.    Common illnesses that are caused by a virus include colds and the flu. Viral illnesses also include serious conditions such as HIV/AIDS (human immunodeficiency virus/acquired immunodeficiency syndrome). A few viruses have been linked to certain cancers.    What are the causes?  Many types of viruses can cause illness. Viruses invade cells in your body, multiply, and cause the infected cells to malfunction or die. When the cell dies, it releases more of the virus. When this happens, you develop symptoms of the illness, and the virus continues to spread to other cells. If the virus takes over the function of the cell, it can cause the cell to divide and grow out of control, as is the case when a virus causes cancer.    Different viruses get into the body in different ways. You can get a virus by:    Swallowing food or water that is contaminated with the virus.  Breathing in droplets that have been coughed or sneezed into the air by an infected person.  Touching a surface that has been contaminated with the virus and then touching your eyes, nose, or mouth.  Being bitten by an insect or animal that carries the virus.  Having sexual contact with a person who is infected with the virus.  Being exposed to blood or fluids that contain the virus, either through an open cut or during a transfusion.    If a virus enters your body, your body's defense system (immune system) will try to fight the virus. You may be at higher risk for a viral illness if your immune system is weak.    What are the signs or symptoms?  Symptoms vary depending on the type of virus and the location of the cells that it invades. Common symptoms of the main types of viral illnesses include:    Cold and flu viruses     Fever.  Headache.  Sore throat.  Muscle aches.  Nasal congestion.  Cough.  Digestive system (gastrointestinal) viruses     Fever.  Abdominal pain.  Nausea.  Diarrhea.  Liver viruses (hepatitis)     Loss of appetite.  Tiredness.  Yellowing of the skin (jaundice).  Brain and spinal cord viruses     Fever.  Headache.  Stiff neck.  Nausea and vomiting.  Confusion or sleepiness.  Skin viruses     Warts.  Itching.  Rash.  Sexually transmitted viruses     Discharge.  Swelling.  Redness.  Rash.  How is this treated?  Viruses can be difficult to treat because they live within cells. Antibiotic medicines do not treat viruses because these drugs do not get inside cells. Treatment for a viral illness may include:    Resting and drinking plenty of fluids.  Medicines to relieve symptoms. These can include over-the-counter medicine for pain and fever, medicines for cough or congestion, and medicines to relieve diarrhea.  Antiviral medicines. These drugs are available only for certain types of viruses. They may help reduce flu symptoms if taken early. There are also many antiviral medicines for hepatitis and HIV/AIDS.    Some viral illnesses can be prevented with vaccinations. A common example is the flu shot.    Follow these instructions at home:  Medicines     Image   Take over-the-counter and prescription medicines only as told by your health care provider.  If you were prescribed an antiviral medicine, take it as told by your health care provider. Do not stop taking the medicine even if you start to feel better.  Be aware of when antibiotics are needed and when they are not needed. Antibiotics do not treat viruses. If your health care provider thinks that you may have a bacterial infection as well as a viral infection, you may get an antibiotic.    Do not ask for an antibiotic prescription if you have been diagnosed with a viral illness. That will not make your illness go away faster.  Frequently taking antibiotics when they are not needed can lead to antibiotic resistance. When this develops, the medicine no longer works against the bacteria that it normally fights.    General instructions     Drink enough fluids to keep your urine clear or pale yellow.  Rest as much as possible.  Return to your normal activities as told by your health care provider. Ask your health care provider what activities are safe for you.  Keep all follow-up visits as told by your health care provider. This is important.  How is this prevented?  ImageTake these actions to reduce your risk of viral infection:    Eat a healthy diet and get enough rest.  Wash your hands often with soap and water. This is especially important when you are in public places. If soap and water are not available, use hand .  Avoid close contact with friends and family who have a viral illness.  If you travel to areas where viral gastrointestinal infection is common, avoid drinking water or eating raw food.  Keep your immunizations up to date. Get a flu shot every year as told by your health care provider.  Do not share toothbrushes, nail clippers, razors, or needles with other people.  Always practice safe sex.    Contact a health care provider if:  You have symptoms of a viral illness that do not go away.  Your symptoms come back after going away.  Your symptoms get worse.  Get help right away if:  You have trouble breathing.  You have a severe headache or a stiff neck.  You have severe vomiting or abdominal pain.  This information is not intended to replace advice given to you by your health care provider. Make sure you discuss any questions you have with your health care provider.    Acute Cough    AMBULATORY CARE:    An acute cough can last up to 3 weeks. Common causes of an acute cough include a cold, allergies, or a lung infection.     Seek care immediately if:   -You have trouble breathing or feel short of breath.  -You cough up blood, or you see blood in your mucus.   -You faint or feel weak or dizzy.   -You have chest pain when you cough or take a deep breath.   -You have new wheezing.     Contact your healthcare provider if:   -You have a fever.   -Your cough lasts longer than 4 weeks.   -Your symptoms do not improve with treatment.   -You have questions or concerns about your condition or care.     Treatment: An acute cough usually goes away on its own. Ask your healthcare provider about medicines you can take to decrease your cough. You may need medicine to stop the cough, decrease swelling in your airways, or help open your airways. Medicine may also be given to help you cough up mucus. If you have an infection caused by bacteria, you may need antibiotics.     Manage your symptoms:     Do not smoke and stay away from others who smoke. Nicotine and other chemicals in cigarettes and cigars can cause lung damage and make your cough worse. Ask your healthcare provider for information if you currently smoke and need help to quit. E-cigarettes or smokeless tobacco still contain nicotine. Talk to your healthcare provider before you use these products.       Drink extra liquids as directed. Liquids will help thin and loosen mucus so you can cough it up. Liquids will also help prevent dehydration. Examples of good liquids to drink include water, fruit juice, and broth. Do not drink liquids that contain caffeine. Caffeine can increase your risk for dehydration. Ask your healthcare provider how much liquid to drink each day.       Rest as directed. Do not do activities that make your cough worse, such as exercise.       Use a humidifier or vaporizer. Use a cool mist humidifier or a vaporizer to increase air moisture in your home. This may make it easier for you to breathe and help decrease your cough.       Eat 2 to 5 mL of honey 2 times each day. Honey can help thin mucus and decrease your cough.       Use cough drops or lozenges. These can help decrease throat irritation and your cough.     Follow up with your healthcare provider as directed: Write down your questions so you remember to ask them during your visits.

## 2019-11-20 NOTE — ED PROVIDER NOTE - NS ED ROS FT
Constitutional: (-) objective fever, however reports subjective fever   Eyes/ENT: (-) blurry vision, (-) epistaxis  Cardiovascular: (-) chest pain, (-) syncope  Respiratory: (+) cough with yellow green sputum, (-) shortness of breath  Gastrointestinal: (-) vomiting, (-) diarrhea  Musculoskeletal: (-) neck pain, (-) back pain, (-) joint pain, (+) myalgias   Integumentary: (-) rash, (-) edema  Neurological: (-) headache, (-) altered mental status  Psychiatric: (-) hallucinations  Allergic/Immunologic: (-) pruritus

## 2019-11-20 NOTE — ED ADULT NURSE NOTE - CHIEF COMPLAINT QUOTE
cough since Wednesday, alert and in no distress.
no fever and no chills.
(1) completely immobile
no fever

## 2019-11-20 NOTE — ED PROVIDER NOTE - CLINICAL SUMMARY MEDICAL DECISION MAKING FREE TEXT BOX
Pt is a 69 year old male c/o cough with yellow/ green sputum, subjective fevers, myalgias x7 days. physical exam reveals rhonchi on RLL, no wheezing or rales. CXR reveals Pt is a 69 year old male c/o cough with yellow/ green sputum, subjective fevers, myalgias x7 days. physical exam reveals rhonchi on RLL, no wheezing or rales. CXR shows no signs of consolidation, however given pt reports worsening chills, rigors and productive cough, with PMH DM, pt will be treated with azithromycin on outpatient basis

## 2019-11-27 PROBLEM — Q80.9 ICHTHYOSIS: Status: ACTIVE | Noted: 2017-12-03

## 2019-11-27 NOTE — ED PROVIDER NOTE - NS ED NOTE AC HIGH RISK COUNTRIES
No
medications reviewed, instructions given on what medications to take and what not to take.   CAPRINI VTE 2.0 SCORE [CLOT updated 2019]    AGE RELATED RISK FACTORS                                                       MOBILITY RELATED FACTORS  [ ] Age 41-60 years                                            (1 Point)                    [ ] Bed rest                                                        (1 Point)  [ ] Age: 61-74 years                                           (2 Points)                  [ ] Plaster cast                                                   (2 Points)  [ ] Age= 75 years                                              (3 Points)                    [ ] Bed bound for more than 72 hours                 (2 Points)    DISEASE RELATED RISK FACTORS                                               GENDER SPECIFIC FACTORS  [ ] Edema in the lower extremities                       (1 Point)              [ ] Pregnancy                                                     (1 Point)  [ ] Varicose veins                                               (1 Point)                     [ ] Post-partum < 6 weeks                                   (1 Point)             [x ] BMI > 25 Kg/m2                                            (1 Point)                     [ ] Hormonal therapy  or oral contraception          (1 Point)                 [ ] Sepsis (in the previous month)                        (1 Point)               [ ] History of pregnancy complications                 (1 point)  [ ] Pneumonia or serious lung disease                                               [ ] Unexplained or recurrent                     (1 Point)           (in the previous month)                               (1 Point)  [ ] Abnormal pulmonary function test                     (1 Point)                 SURGERY RELATED RISK FACTORS  [ ] Acute myocardial infarction                              (1 Point)               [ ]  Section                                             (1 Point)  [ ] Congestive heart failure (in the previous month)  (1 Point)      [x ] Minor surgery                                                  (1 Point)   [ ] Inflammatory bowel disease                             (1 Point)               [ ] Arthroscopic surgery                                        (2 Points)  [ ] Central venous access                                      (2 Points)                [ ] General surgery lasting more than 45 minutes (2 points)  [ ] Malignancy- Present or previous                   (2 Points)                [ ] Elective arthroplasty                                         (5 points)    [ ] Stroke (in the previous month)                          (5 Points)                                                                                                                                                           HEMATOLOGY RELATED FACTORS                                                 TRAUMA RELATED RISK FACTORS  [ ] Prior episodes of VTE                                     (3 Points)                [ ] Fracture of the hip, pelvis, or leg                       (5 Points)  [ ] Positive family history for VTE                         (3 Points)             [ ] Acute spinal cord injury (in the previous month)  (5 Points)  [ ] Prothrombin 58017 A                                     (3 Points)               [ ] Paralysis  (less than 1 month)                             (5 Points)  [ ] Factor V Leiden                                             (3 Points)                  [ ] Multiple Trauma within 1 month                        (5 Points)  [ ] Lupus anticoagulants                                     (3 Points)                                                           [ ] Anticardiolipin antibodies                               (3 Points)                                                       [ ] High homocysteine in the blood                      (3 Points)                                             [ ] Other congenital or acquired thrombophilia      (3 Points)                                                [ ] Heparin induced thrombocytopenia                  (3 Points)                                     Total Score [       2   ]    OPIOID RISK TOOL    SCOTTIE EACH BOX THAT APPLIES AND ADD TOTALS AT THE END    FAMILY HISTORY OF SUBSTANCE ABUSE                 FEMALE         MALE                                                Alcohol                             [  ]1 pt          [ x ]3pts                                               Illegal Drugs                     [  ]2 pts        [  ]3pts                                               Rx Drugs                           [  ]4 pts        [  ]4 pts    PERSONAL HISTORY OF SUBSTANCE ABUSE                                                                                          Alcohol                             [  ]3 pts       [  ]3 pts                                               Illegal Drugs                     [  ]4 pts        [  ]4 pts                                               Rx Drugs                           [  ]5 pts        [  ]5 pts    AGE BETWEEN 16-45 YEARS                                      [  ]1 pt         [x  ]1 pt    HISTORY OF PREADOLESCENT   SEXUAL ABUSE                                                             [  ]3 pts        [  ]0pts    PSYCHOLOGICAL DISEASE                     ADD, OCD, Bipolar, Schizophrenia        [  ]2 pts         [ x ]2 pts                      Depression                                               [  ]1 pt           [  ]1 pt           SCORING TOTAL   (add numbers and type here)              (6 )                                     A score of 3 or lower indicated LOW risk for future opioid abuse  A score of 4 to 7 indicated moderate risk for future opioid abuse  A score of 8 or higher indicates a high risk for opioid abuse

## 2020-01-16 ENCOUNTER — APPOINTMENT (OUTPATIENT)
Dept: GERIATRICS | Facility: CLINIC | Age: 70
End: 2020-01-16

## 2020-01-30 ENCOUNTER — APPOINTMENT (OUTPATIENT)
Dept: ENDOCRINOLOGY | Facility: CLINIC | Age: 70
End: 2020-01-30

## 2020-02-11 ENCOUNTER — APPOINTMENT (OUTPATIENT)
Dept: PODIATRY | Facility: CLINIC | Age: 70
End: 2020-02-11

## 2020-09-06 NOTE — ED PROVIDER NOTE - NS ED ROS FT
CONST: No fever, chills or bodyaches  EYES: No pain, redness, drainage or visual changes.  ENT: No ear pain or discharge, nasal discharge or congestion. No sore throat (+) dental pain  CARD: No chest pain, palpitations  RESP: No SOB, cough details… detailed exam

## 2022-06-09 NOTE — PHYSICAL EXAM
[General Appearance - Alert] : alert [General Appearance - In No Acute Distress] : in no acute distress [Sclera] : the sclera and conjunctiva were normal [PERRL With Normal Accommodation] : pupils were equal in size, round, and reactive to light [Extraocular Movements] : extraocular movements were intact [Oropharynx] : the oropharynx was normal [Outer Ear] : the ears and nose were normal in appearance [Neck Appearance] : the appearance of the neck was normal [Neck Cervical Mass (___cm)] : no neck mass was observed [Jugular Venous Distention Increased] : there was no jugular-venous distention [Thyroid Nodule] : there were no palpable thyroid nodules [Thyroid Diffuse Enlargement] : the thyroid was not enlarged [Auscultation Breath Sounds / Voice Sounds] : lungs were clear to auscultation bilaterally [Heart Sounds] : normal S1 and S2 [Heart Rate And Rhythm] : heart rate was normal and rhythm regular [Heart Sounds Gallop] : no gallops [Murmurs] : no murmurs [Full Pulse] : the pedal pulses are present [Heart Sounds Pericardial Friction Rub] : no pericardial rub [Edema] : there was no peripheral edema Hemigard Retention Suture: 2-0 Nylon [Penis Abnormality] : the penis was normal [Scrotum] : the scrotum was normal [Testes Swelling] : the testicles were not swollen [Cervical Lymph Nodes Enlarged Posterior Bilaterally] : posterior cervical [Testes Mass (___cm)] : there were no testicular masses [Supraclavicular Lymph Nodes Enlarged Bilaterally] : supraclavicular [Cervical Lymph Nodes Enlarged Anterior Bilaterally] : anterior cervical [Axillary Lymph Nodes Enlarged Bilaterally] : axillary [Inguinal Lymph Nodes Enlarged Bilaterally] : inguinal [Femoral Lymph Nodes Enlarged Bilaterally] : femoral [No Spinal Tenderness] : no spinal tenderness [No CVA Tenderness] : no ~M costovertebral angle tenderness [Abnormal Walk] : normal gait [Nail Clubbing] : no clubbing  or cyanosis of the fingernails [Motor Tone] : muscle strength and tone were normal [Musculoskeletal - Swelling] : no joint swelling seen [Skin Color & Pigmentation] : normal skin color and pigmentation [Skin Turgor] : normal skin turgor [] : no rash [Deep Tendon Reflexes (DTR)] : deep tendon reflexes were 2+ and symmetric [Sensation] : the sensory exam was normal to light touch and pinprick [No Focal Deficits] : no focal deficits [Oriented To Time, Place, And Person] : oriented to person, place, and time [Affect] : the affect was normal [Impaired Insight] : insight and judgment were intact

## 2022-06-30 ENCOUNTER — INPATIENT (INPATIENT)
Facility: HOSPITAL | Age: 72
LOS: 0 days | Discharge: HOME HEALTH | End: 2022-07-01
Attending: STUDENT IN AN ORGANIZED HEALTH CARE EDUCATION/TRAINING PROGRAM | Admitting: STUDENT IN AN ORGANIZED HEALTH CARE EDUCATION/TRAINING PROGRAM

## 2022-06-30 VITALS
SYSTOLIC BLOOD PRESSURE: 239 MMHG | OXYGEN SATURATION: 100 % | TEMPERATURE: 99 F | DIASTOLIC BLOOD PRESSURE: 128 MMHG | HEART RATE: 99 BPM | RESPIRATION RATE: 20 BRPM | WEIGHT: 182.98 LBS

## 2022-06-30 DIAGNOSIS — Z98.890 OTHER SPECIFIED POSTPROCEDURAL STATES: Chronic | ICD-10-CM

## 2022-06-30 LAB
ALBUMIN SERPL ELPH-MCNC: 4.5 G/DL — SIGNIFICANT CHANGE UP (ref 3.5–5.2)
ALP SERPL-CCNC: 112 U/L — SIGNIFICANT CHANGE UP (ref 30–115)
ALT FLD-CCNC: 25 U/L — SIGNIFICANT CHANGE UP (ref 0–41)
ANION GAP SERPL CALC-SCNC: 18 MMOL/L — HIGH (ref 7–14)
AST SERPL-CCNC: 20 U/L — SIGNIFICANT CHANGE UP (ref 0–41)
B-OH-BUTYR SERPL-SCNC: 0.2 MMOL/L — SIGNIFICANT CHANGE UP
BASE EXCESS BLDV CALC-SCNC: 2.9 MMOL/L — SIGNIFICANT CHANGE UP (ref -2–3)
BASOPHILS # BLD AUTO: 0.07 K/UL — SIGNIFICANT CHANGE UP (ref 0–0.2)
BASOPHILS NFR BLD AUTO: 0.9 % — SIGNIFICANT CHANGE UP (ref 0–1)
BILIRUB SERPL-MCNC: <0.2 MG/DL — SIGNIFICANT CHANGE UP (ref 0.2–1.2)
BUN SERPL-MCNC: 31 MG/DL — HIGH (ref 10–20)
CA-I SERPL-SCNC: 1.19 MMOL/L — SIGNIFICANT CHANGE UP (ref 1.15–1.33)
CALCIUM SERPL-MCNC: 9.9 MG/DL — SIGNIFICANT CHANGE UP (ref 8.5–10.1)
CHLORIDE SERPL-SCNC: 94 MMOL/L — LOW (ref 98–110)
CO2 SERPL-SCNC: 20 MMOL/L — SIGNIFICANT CHANGE UP (ref 17–32)
CREAT SERPL-MCNC: 1.6 MG/DL — HIGH (ref 0.7–1.5)
EGFR: 46 ML/MIN/1.73M2 — LOW
EOSINOPHIL # BLD AUTO: 0.46 K/UL — SIGNIFICANT CHANGE UP (ref 0–0.7)
EOSINOPHIL NFR BLD AUTO: 6 % — SIGNIFICANT CHANGE UP (ref 0–8)
GAS PNL BLDV: 129 MMOL/L — LOW (ref 136–145)
GAS PNL BLDV: SIGNIFICANT CHANGE UP
GLUCOSE BLDC GLUCOMTR-MCNC: 395 MG/DL — HIGH (ref 70–99)
GLUCOSE SERPL-MCNC: 376 MG/DL — HIGH (ref 70–99)
HCO3 BLDV-SCNC: 29 MMOL/L — SIGNIFICANT CHANGE UP (ref 22–29)
HCT VFR BLD CALC: 43.3 % — SIGNIFICANT CHANGE UP (ref 42–52)
HCT VFR BLDA CALC: 42 % — SIGNIFICANT CHANGE UP (ref 39–51)
HGB BLD CALC-MCNC: 13.9 G/DL — SIGNIFICANT CHANGE UP (ref 12.6–17.4)
HGB BLD-MCNC: 14.3 G/DL — SIGNIFICANT CHANGE UP (ref 14–18)
IMM GRANULOCYTES NFR BLD AUTO: 0.3 % — SIGNIFICANT CHANGE UP (ref 0.1–0.3)
LACTATE BLDV-MCNC: 1.9 MMOL/L — SIGNIFICANT CHANGE UP (ref 0.5–2)
LYMPHOCYTES # BLD AUTO: 2.58 K/UL — SIGNIFICANT CHANGE UP (ref 1.2–3.4)
LYMPHOCYTES # BLD AUTO: 33.5 % — SIGNIFICANT CHANGE UP (ref 20.5–51.1)
MAGNESIUM SERPL-MCNC: 2 MG/DL — SIGNIFICANT CHANGE UP (ref 1.8–2.4)
MCHC RBC-ENTMCNC: 26.4 PG — LOW (ref 27–31)
MCHC RBC-ENTMCNC: 33 G/DL — SIGNIFICANT CHANGE UP (ref 32–37)
MCV RBC AUTO: 79.9 FL — LOW (ref 80–94)
MONOCYTES # BLD AUTO: 0.88 K/UL — HIGH (ref 0.1–0.6)
MONOCYTES NFR BLD AUTO: 11.4 % — HIGH (ref 1.7–9.3)
NEUTROPHILS # BLD AUTO: 3.7 K/UL — SIGNIFICANT CHANGE UP (ref 1.4–6.5)
NEUTROPHILS NFR BLD AUTO: 47.9 % — SIGNIFICANT CHANGE UP (ref 42.2–75.2)
NRBC # BLD: 0 /100 WBCS — SIGNIFICANT CHANGE UP (ref 0–0)
NT-PROBNP SERPL-SCNC: 135 PG/ML — SIGNIFICANT CHANGE UP (ref 0–300)
PCO2 BLDV: 49 MMHG — SIGNIFICANT CHANGE UP (ref 42–55)
PH BLDV: 7.38 — SIGNIFICANT CHANGE UP (ref 7.32–7.43)
PLATELET # BLD AUTO: 321 K/UL — SIGNIFICANT CHANGE UP (ref 130–400)
PO2 BLDV: 32 MMHG — SIGNIFICANT CHANGE UP
POTASSIUM BLDV-SCNC: 5.5 MMOL/L — HIGH (ref 3.5–5.1)
POTASSIUM SERPL-MCNC: 5.6 MMOL/L — HIGH (ref 3.5–5)
POTASSIUM SERPL-SCNC: 5.6 MMOL/L — HIGH (ref 3.5–5)
PROT SERPL-MCNC: 8.1 G/DL — HIGH (ref 6–8)
RBC # BLD: 5.42 M/UL — SIGNIFICANT CHANGE UP (ref 4.7–6.1)
RBC # FLD: 14 % — SIGNIFICANT CHANGE UP (ref 11.5–14.5)
SAO2 % BLDV: 44.9 % — SIGNIFICANT CHANGE UP
SARS-COV-2 RNA SPEC QL NAA+PROBE: SIGNIFICANT CHANGE UP
SODIUM SERPL-SCNC: 132 MMOL/L — LOW (ref 135–146)
TROPONIN T SERPL-MCNC: <0.01 NG/ML — SIGNIFICANT CHANGE UP
TROPONIN T SERPL-MCNC: <0.01 NG/ML — SIGNIFICANT CHANGE UP
WBC # BLD: 7.71 K/UL — SIGNIFICANT CHANGE UP (ref 4.8–10.8)
WBC # FLD AUTO: 7.71 K/UL — SIGNIFICANT CHANGE UP (ref 4.8–10.8)

## 2022-06-30 PROCEDURE — 99285 EMERGENCY DEPT VISIT HI MDM: CPT

## 2022-06-30 PROCEDURE — 93010 ELECTROCARDIOGRAM REPORT: CPT | Mod: 76

## 2022-06-30 PROCEDURE — 93010 ELECTROCARDIOGRAM REPORT: CPT | Mod: 77

## 2022-06-30 PROCEDURE — 71045 X-RAY EXAM CHEST 1 VIEW: CPT | Mod: 26

## 2022-06-30 RX ORDER — LANOLIN ALCOHOL/MO/W.PET/CERES
3 CREAM (GRAM) TOPICAL AT BEDTIME
Refills: 0 | Status: DISCONTINUED | OUTPATIENT
Start: 2022-06-30 | End: 2022-07-01

## 2022-06-30 RX ORDER — ACETAMINOPHEN 500 MG
650 TABLET ORAL EVERY 6 HOURS
Refills: 0 | Status: DISCONTINUED | OUTPATIENT
Start: 2022-06-30 | End: 2022-07-01

## 2022-06-30 RX ORDER — METOPROLOL TARTRATE 50 MG
12.5 TABLET ORAL
Refills: 0 | Status: DISCONTINUED | OUTPATIENT
Start: 2022-07-01 | End: 2022-07-01

## 2022-06-30 RX ORDER — ASPIRIN/CALCIUM CARB/MAGNESIUM 324 MG
81 TABLET ORAL DAILY
Refills: 0 | Status: DISCONTINUED | OUTPATIENT
Start: 2022-06-30 | End: 2022-07-01

## 2022-06-30 RX ORDER — HEPARIN SODIUM 5000 [USP'U]/ML
5000 INJECTION INTRAVENOUS; SUBCUTANEOUS EVERY 12 HOURS
Refills: 0 | Status: DISCONTINUED | OUTPATIENT
Start: 2022-06-30 | End: 2022-07-01

## 2022-06-30 RX ORDER — NIFEDIPINE 30 MG
60 TABLET, EXTENDED RELEASE 24 HR ORAL DAILY
Refills: 0 | Status: DISCONTINUED | OUTPATIENT
Start: 2022-06-30 | End: 2022-06-30

## 2022-06-30 RX ORDER — INSULIN GLARGINE 100 [IU]/ML
15 INJECTION, SOLUTION SUBCUTANEOUS AT BEDTIME
Refills: 0 | Status: DISCONTINUED | OUTPATIENT
Start: 2022-06-30 | End: 2022-07-01

## 2022-06-30 RX ORDER — INSULIN LISPRO 100/ML
5 VIAL (ML) SUBCUTANEOUS
Refills: 0 | Status: DISCONTINUED | OUTPATIENT
Start: 2022-06-30 | End: 2022-07-01

## 2022-06-30 RX ORDER — SODIUM ZIRCONIUM CYCLOSILICATE 10 G/10G
10 POWDER, FOR SUSPENSION ORAL ONCE
Refills: 0 | Status: COMPLETED | OUTPATIENT
Start: 2022-06-30 | End: 2022-06-30

## 2022-06-30 RX ORDER — ONDANSETRON 8 MG/1
4 TABLET, FILM COATED ORAL EVERY 8 HOURS
Refills: 0 | Status: DISCONTINUED | OUTPATIENT
Start: 2022-06-30 | End: 2022-07-01

## 2022-06-30 RX ORDER — INSULIN HUMAN 100 [IU]/ML
10 INJECTION, SOLUTION SUBCUTANEOUS ONCE
Refills: 0 | Status: COMPLETED | OUTPATIENT
Start: 2022-06-30 | End: 2022-06-30

## 2022-06-30 RX ORDER — SODIUM CHLORIDE 9 MG/ML
1000 INJECTION INTRAMUSCULAR; INTRAVENOUS; SUBCUTANEOUS ONCE
Refills: 0 | Status: COMPLETED | OUTPATIENT
Start: 2022-06-30 | End: 2022-06-30

## 2022-06-30 RX ORDER — METOPROLOL TARTRATE 50 MG
25 TABLET ORAL DAILY
Refills: 0 | Status: DISCONTINUED | OUTPATIENT
Start: 2022-06-30 | End: 2022-06-30

## 2022-06-30 RX ORDER — METOPROLOL TARTRATE 50 MG
50 TABLET ORAL ONCE
Refills: 0 | Status: COMPLETED | OUTPATIENT
Start: 2022-06-30 | End: 2022-06-30

## 2022-06-30 RX ORDER — LABETALOL HCL 100 MG
10 TABLET ORAL ONCE
Refills: 0 | Status: COMPLETED | OUTPATIENT
Start: 2022-06-30 | End: 2022-06-30

## 2022-06-30 RX ORDER — INSULIN LISPRO 100/ML
VIAL (ML) SUBCUTANEOUS
Refills: 0 | Status: DISCONTINUED | OUTPATIENT
Start: 2022-06-30 | End: 2022-07-01

## 2022-06-30 RX ORDER — AMLODIPINE BESYLATE 2.5 MG/1
10 TABLET ORAL DAILY
Refills: 0 | Status: DISCONTINUED | OUTPATIENT
Start: 2022-07-01 | End: 2022-07-01

## 2022-06-30 RX ADMIN — Medication 10 MILLIGRAM(S): at 18:23

## 2022-06-30 RX ADMIN — INSULIN GLARGINE 15 UNIT(S): 100 INJECTION, SOLUTION SUBCUTANEOUS at 21:55

## 2022-06-30 RX ADMIN — Medication 650 MILLIGRAM(S): at 21:55

## 2022-06-30 RX ADMIN — SODIUM CHLORIDE 2000 MILLILITER(S): 9 INJECTION INTRAMUSCULAR; INTRAVENOUS; SUBCUTANEOUS at 13:45

## 2022-06-30 RX ADMIN — Medication 60 MILLIGRAM(S): at 18:22

## 2022-06-30 RX ADMIN — SODIUM ZIRCONIUM CYCLOSILICATE 10 GRAM(S): 10 POWDER, FOR SUSPENSION ORAL at 21:55

## 2022-06-30 RX ADMIN — Medication 5: at 21:56

## 2022-06-30 RX ADMIN — Medication 650 MILLIGRAM(S): at 23:31

## 2022-06-30 RX ADMIN — Medication 50 MILLIGRAM(S): at 18:01

## 2022-06-30 RX ADMIN — INSULIN HUMAN 10 UNIT(S): 100 INJECTION, SOLUTION SUBCUTANEOUS at 18:23

## 2022-06-30 NOTE — H&P ADULT - TIME BILLING
HPI as above.  Interval history: Pt seen and examined at bedside. No cp or sob.   Vital Signs (24 Hrs):  T(C): 36.1 (07-01-22 @ 15:40), Max: 36.8 (07-01-22 @ 05:49)  HR: 88 (07-01-22 @ 15:40) (73 - 88)  BP: 152/79 (07-01-22 @ 15:40) (109/74 - 198/101)  RR: 18 (07-01-22 @ 15:40) (16 - 18)  SpO2: 97% (07-01-22 @ 15:40) (96% - 100%)  Wt(kg): --  Daily     Daily     I&O's Summary    PHYSICAL EXAM:  GENERAL: NAD, well-developed  HEAD:  Atraumatic, Normocephalic  EYES: EOMI, PERRLA, conjunctiva and sclera clear  NECK: Supple, No JVD  CHEST/LUNG: Clear to auscultation bilaterally; No wheeze  HEART: Regular rate and rhythm; No murmurs, rubs, or gallops  ABDOMEN: Soft, Nontender, Nondistended; Bowel sounds present  EXTREMITIES:  2+ Peripheral Pulses, No clubbing, cyanosis, or edema  PSYCH: AAOx3  NEUROLOGY: non-focal  SKIN: No rashes or lesions  Labs reviewed  Imaging reviewed independently and reviewed read  < from: Xray Chest 1 View- PORTABLE-Urgent (06.30.22 @ 14:25) >    Impression:    Bilateral interstitial opacities.    < end of copied text >      EKG reviewed independently and reviewed read  < from: 12 Lead ECG (06.30.22 @ 21:09) >    Diagnosis Line Normal sinus rhythm  Right bundle branch block  Inferior infarct , age undetermined  Abnormal ECG    < end of copied text >      Plan  #DM - endo consult  #HTN resolved- continue meds

## 2022-06-30 NOTE — ED PROVIDER NOTE - OBJECTIVE STATEMENT
71-year-old male with a past medical history of hypertension, hyperlipidemia, CAD on Plavix who presents with chest pain and medication refill.  Reports that he moved back to [place] in 2018 and now moved back into 2-month ago.  Reports that while he was back in his country, he was taking medication prescribed by his doctor in [place].  Patient came back 2-month ago and has run out of all his medications.  Reports that he does not remember the name of the medications and dosage of the medication that he was.  Also endorses chest pain that started 2 days ago.  Pain is in the left side of the chest, nonradiating, nonexertional, and there is no alleviating or worsening factor.  Denies family history of cardiac problems.  Former smoker.  Denies fever, shortness of breath, nausea, vomiting, abdominal pain, urinary symptoms, and change in bowel movement. Also endorses elevated blood pressure and glucose.

## 2022-06-30 NOTE — ED PROVIDER NOTE - CARE PLAN
Principal Discharge DX:	Elevated blood sugar  Secondary Diagnosis:	Elevated blood pressure reading  Secondary Diagnosis:	Chest pain   1

## 2022-06-30 NOTE — ED PROVIDER NOTE - PHYSICAL EXAMINATION
CONSTITUTIONAL: In no apparent distress.   HEAD: Normocephalic; atraumatic.   EYES: Pupils are round and reactive, extra-ocular muscles are intact. Eyelids are normal in appearance without swelling or lesions.   ENT: Hearing is intact with good acuity to spoken voice. Patient is speaking clearly, not muffled and airway is intact.   RESPIRATORY: No signs of respiratory distress. Lung sounds are clear in all lobes bilaterally without rales, rhonchi, or wheezes.  CARDIOVASCULAR: Regular rate and rhythm.   GI: Abdomen is soft, non-tender, and without distention. Bowel sounds are present and normoactive in all four quadrants. No masses are noted.   NEURO: A & O x 3. Normal speech  PSYCHOLOGICAL: Appropriate mood and affect. Good judgement and insight.

## 2022-06-30 NOTE — ED PROVIDER NOTE - NS ED ATTENDING STATEMENT MOD
This was a shared visit with the ROXANE. I reviewed and verified the documentation and independently performed the documented:

## 2022-06-30 NOTE — ED ADULT NURSE NOTE - NSICDXFAMILYHX_GEN_ALL_CORE_FT
FAMILY HISTORY:  Father  Still living? Unknown  Family history of heart disease, Age at diagnosis: Age Unknown  Family history of hypertension, Age at diagnosis: Age Unknown    Mother  Still living? Unknown  Family history of hypertension, Age at diagnosis: Age Unknown    Sibling  Still living? Unknown  Family history of diabetes mellitus, Age at diagnosis: Age Unknown

## 2022-06-30 NOTE — PATIENT PROFILE ADULT - FALL HARM RISK - RISK INTERVENTIONS

## 2022-06-30 NOTE — H&P ADULT - NSHPLABSRESULTS_GEN_ALL_CORE
LABS:  cret                        14.3   7.71  )-----------( 321      ( 30 Jun 2022 13:52 )             43.3     06-30    132<L>  |  94<L>  |  31<H>  ----------------------------<  376<H>  5.6<H>   |  20  |  1.6<H>    Ca    9.9      30 Jun 2022 13:52  Mg     2.0     06-30    TPro  8.1<H>  /  Alb  4.5  /  TBili  <0.2  /  DBili  x   /  AST  20  /  ALT  25  /  AlkPhos  112  06-30

## 2022-06-30 NOTE — H&P ADULT - ASSESSMENT
71 M with hx of CAD s/ PCI, CKD, DMII, CHF, GERD, HTN, DLD, and gout presents for evaluation of elevated BP and left chest pain after running out of his medications.    #HTN urgency    #DM with yperglycemia    #CKD    #CAD  #CHF    #HLD    #Gout    DVT Prophylaxis:  GI Prophylaxis:  Diet:   71 M with hx of CAD s/ PCI, CKD, DMII, CHF, GERD, HTN, DLD, and gout presents for evaluation of elevated BP and left chest pain after running out of his medications.    #HTN urgency  - BP up to 239/128 on admission, currently 183/91 after labetalol and nifedipine  - Asymptomatic  - Would avoid overcorrection by more than 25% for now  - Per med rec from 2018 was on amlodipine and lisinopril-HCTZ  - He does not know his current medications but says he is only on 1 blood pressure medication currently  - Resume amlodipine 10 mg    #Noncardiac chest pain  - "Pinching" pain not related to rest or exertion x few hours resolved without intervention  - Troponin negative x1, repeat one more    #DM with hyperglycemia  - Takes insulin 30 units i    #CKD    #CAD  #CHF    #HLD    #Gout    DVT Prophylaxis:  GI Prophylaxis:  Diet:   71 M with hx of CAD s/ PCI, CKD, DMII, CHF, GERD, HTN, DLD, and gout presents for evaluation of elevated BP and left chest pain after running out of his medications.    #HTN urgency  - BP up to 239/128 on admission, currently 183/91 after labetalol and nifedipine  - Asymptomatic  - Would avoid overcorrection by more than 25% for now  - Per med rec from 2018 was on amlodipine and lisinopril-HCTZ  - He does not know his current medications but says he is only on 1 blood pressure medication currently  - Resume amlodipine 10 mg    #Noncardiac chest pain  - "Pinching" pain not related to rest or exertion x few hours resolved without intervention  - Troponin negative x1, repeat one more    #DM with hyperglycemia  - BHB low  - Start basal bolus insulin    #CKD  #Hyperkalemia  - Cr at baseline from 2018  - K 5.6, s/p insulin IV  - Lokelma 10 mg    #CAD  #CHF  - TTE  - Was on ASA, Plavix, Toprol 25 in 2018--does not know current meds  - ASA, Lopressor 12.5 BID    #HLD  - Lipid profile  - Reports "muscle pain" with cholesterol meds in the past and is no longer taking    #Gout  - Not taking meds    DVT Prophylaxis: Heparin SQ  GI Prophylaxis: NPO  Diet: DASH/CC   71 M with hx of CAD s/ PCI, CKD, DMII, CHF, GERD, HTN, DLD, and gout presents for evaluation of elevated BP and left chest pain after running out of his medications.    #HTN urgency  - BP up to 239/128 on admission, currently 183/91 after labetalol and nifedipine  - Asymptomatic  - Would avoid overcorrection by more than 25% for now  - Per med rec from 2018 was on amlodipine and lisinopril-HCTZ  - He does not know his current medications but says he is only on 1 blood pressure medication currently  - Resume amlodipine 10 mg    #Noncardiac chest pain  - "Pinching" pain not related to rest or exertion x few hours resolved without intervention  - Troponin negative x1, repeat one more    #DM with hyperglycemia  - BHB low  - Start basal bolus insulin    #CKD  #Hyperkalemia  - Cr at baseline from 2018  - K 5.6, s/p insulin IV  - Lokelma 10 mg    #CAD, s/p 1 stent 5 y ago  #CHF  - TTE  - Was on ASA, Plavix, Toprol 25 in 2018--does not know current meds  - ASA, Lopressor 12.5 BID    #HLD  - Lipid profile  - Reports "muscle pain" with cholesterol meds in the past and is no longer taking them    #Gout  - Not taking meds    DVT Prophylaxis: Heparin SQ  GI Prophylaxis: NPO  Diet: DASH/CC    Med rec not verified. Patient does not know meds and pharmacy is in [place]. He has an appointment with Dr. Luna July 13. He will need prescriptions for meds before discharge until he follows up outpatient.

## 2022-06-30 NOTE — ED ADULT TRIAGE NOTE - WEIGHT IN KG
Voicemail left for parent explaining need to cancel appt, requested callback to office, office number given.      Appointment canceled.  
[phone]

## 2022-06-30 NOTE — ED PROVIDER NOTE - PROGRESS NOTE DETAILS
Patient appears calm and in no acute distress. BP is elevated; meds given. Glucose is elevated; insulin given; not in DKA. Pt is admitted for meds refill, further monitoring. Spoke with the provider from . Discussed case, reviewed results, and agreed to admit the patient.

## 2022-06-30 NOTE — ED PROVIDER NOTE - NS ED ROS FT
Constitutional: Negative for fever  HENT: Negative for headache  Eyes: Negative for eye pain, and vision change.  Cardiovascular: + chest pain. Negative for palpitation.  Respiratory: Negative for SOB, wheezing, cough and sputum production.  Gastrointestinal: Negative for nausea, vomiting, abdominal pain  Genitourinary: Negative for flank pain, dysuria, frequency, and hematuria.  Neurological: Negative for dizziness, syncope, and loss of consciousness.  Musculoskeletal: Negative for joint swelling, arthralgias, back pain, neck pain, and calf cramps.  Hematological: Does not bruise/bleed easily.

## 2022-06-30 NOTE — ED PROVIDER NOTE - ATTENDING APP SHARED VISIT CONTRIBUTION OF CARE
72 y/o male h/o HTN, HLD, DM on insulin and oral agents, CAD s/p stents, last seen by his PMD in 2019, has been living in [place] and renewing his medications there, but does not recall names or doses (unable to obtain them from local pharmacy), now p/w (1) elevated blood pressure reading and elevated blood glucose x several days, also report, (3) left chest pain x this AM, persistent, denies radiation  / modifying factors, denies exertional chest pain, fever, nausea, vomiting, diaphoresis, hemoptysis, SOB, orthopnea, PND, LE pain or swelling, recent immobilization or travel or other associated complaints at present.    Previous cardiac evaluation; stress test in 2017 seen in old chart.  Old chart reviewed.  I have reviewed and agree with the initial nursing note, except as documented in my note.    VSS, awake, alert, non-toxic appearing, lying comfortably in stretcher, in NAD, oropharynx clear, mmm, no skin rash or lesions, chest CTAB, non-labored breathing, no w/r/r, +S1/S2, RRR, no m/r/g, abdomen soft, NT, ND, +BS, no organomegaly or pulsatile masses, no peripheral edema or deformities, equal pulses upper and lower extremities, alert, clear speech and steady gait.

## 2022-06-30 NOTE — ED ADULT TRIAGE NOTE - CHIEF COMPLAINT QUOTE
elevated BP and FS at home (>500) . pt ran out of meds one week ago (unable to recall home meds). [place] and just came back two weeks ago. he has an appointment with his primary provide on 8/15. FS in triage 365 with elevated BP . denies headache or blurry vision , reports chest pain

## 2022-06-30 NOTE — ED PROVIDER NOTE - CADM POA CENTRAL LINE
swollen infected gums, has dentures and gums got infected swollen infected gums, has dentures and gums got infected. went to dentist and he told her go to the hospital No

## 2022-06-30 NOTE — H&P ADULT - HISTORY OF PRESENT ILLNESS
71 M with hx of CAD s/ PCI, CKD, DMII, CHF, GERD, HTN, DLD, and gout presents for evaluation of elevated BP and left chest pain. The patient lives in [place], has not seen a doctor in years and ran out of his medications.     ED VS: T(F): , Max: 98.8 HR:  (80 - 99) BP:  (195/91 - 239/128) RR: 18 SpO2:  (96% - 100%)    Labs: Glucose 365, K 5.6, Cr 1.6 (baseline)  Pt received labetalol 10 mg IV, Lopressor, insulin IV 10 units 71 M with hx of CAD s/ PCI 5 y ago, CKD, DMII, CHF, GERD, HTN, DLD, and gout presents for evaluation of elevated BP and glucose at home. The patient lives in [place] where he gets his meds, came back and ran out of his medications week ago. Today he checked his BP and fingerstick glucose at home and they were high so he came to the ED. He some left sided chest pain that he described as pinching, lasted a few hours and was not related to rest or exertion and resolved by itself.     ED VS: T(F): , Max: 98.8 HR:  (80 - 99) BP:  (195/91 - 239/128) RR: 18 SpO2:  (96% - 100%)    Labs: Glucose 365, K 5.6, Cr 1.6 (baseline)  Pt received labetalol 10 mg IV, Lopressor, insulin IV 10 units 71 M with hx of CAD s/ PCI 5 y ago, CKD, DMII, CHF, GERD, HTN, DLD, and gout presents for evaluation of elevated BP and glucose at home. The patient lives in [place] where he gets his meds, came back and ran out of his medications week ago. Today he checked his BP and fingerstick glucose at home and they were high so he came to the ED. He some left sided chest pain that he described as pinching, lasted a few hours and was not related to rest or exertion and resolved by itself. He denies headache, blurry vision, dizziness, weakness, SOB, palpitations, nausea, vomiting, leg swelling, dysuria.  The patient does not know the names of his medications, med rec unable to be verified.    ED VS: T(F): , Max: 98.8 HR:  (80 - 99) BP:  (195/91 - 239/128) RR: 18 SpO2:  (96% - 100%)    Labs: Glucose 365, K 5.6, Cr 1.6 (baseline), troponin negative x1, BHB 0.2  Pt received labetalol 10 mg IV, Lopressor, insulin IV 10 units

## 2022-06-30 NOTE — ED ADULT NURSE NOTE - NSICDXPASTMEDICALHX_GEN_ALL_CORE_FT
PAST MEDICAL HISTORY:  CAD (coronary artery disease)     CHF (congestive heart failure)     CKD (chronic kidney disease), stage III     Diabetes     Dyslipidemia     GERD (gastroesophageal reflux disease)     Gout     HTN (hypertension)

## 2022-07-01 ENCOUNTER — TRANSCRIPTION ENCOUNTER (OUTPATIENT)
Age: 72
End: 2022-07-01

## 2022-07-01 VITALS
RESPIRATION RATE: 18 BRPM | HEART RATE: 88 BPM | OXYGEN SATURATION: 97 % | SYSTOLIC BLOOD PRESSURE: 152 MMHG | TEMPERATURE: 97 F | DIASTOLIC BLOOD PRESSURE: 79 MMHG

## 2022-07-01 LAB
A1C WITH ESTIMATED AVERAGE GLUCOSE RESULT: 11.1 % — HIGH (ref 4–5.6)
ALBUMIN SERPL ELPH-MCNC: 4.1 G/DL — SIGNIFICANT CHANGE UP (ref 3.5–5.2)
ALP SERPL-CCNC: 100 U/L — SIGNIFICANT CHANGE UP (ref 30–115)
ALT FLD-CCNC: 20 U/L — SIGNIFICANT CHANGE UP (ref 0–41)
ANION GAP SERPL CALC-SCNC: 13 MMOL/L — SIGNIFICANT CHANGE UP (ref 7–14)
AST SERPL-CCNC: 16 U/L — SIGNIFICANT CHANGE UP (ref 0–41)
BASOPHILS # BLD AUTO: 0.08 K/UL — SIGNIFICANT CHANGE UP (ref 0–0.2)
BASOPHILS NFR BLD AUTO: 1 % — SIGNIFICANT CHANGE UP (ref 0–1)
BILIRUB SERPL-MCNC: <0.2 MG/DL — SIGNIFICANT CHANGE UP (ref 0.2–1.2)
BUN SERPL-MCNC: 25 MG/DL — HIGH (ref 10–20)
CALCIUM SERPL-MCNC: 9.4 MG/DL — SIGNIFICANT CHANGE UP (ref 8.5–10.1)
CHLORIDE SERPL-SCNC: 95 MMOL/L — LOW (ref 98–110)
CHOLEST SERPL-MCNC: 288 MG/DL — HIGH
CO2 SERPL-SCNC: 24 MMOL/L — SIGNIFICANT CHANGE UP (ref 17–32)
CREAT SERPL-MCNC: 1.5 MG/DL — SIGNIFICANT CHANGE UP (ref 0.7–1.5)
EGFR: 49 ML/MIN/1.73M2 — LOW
EOSINOPHIL # BLD AUTO: 0.62 K/UL — SIGNIFICANT CHANGE UP (ref 0–0.7)
EOSINOPHIL NFR BLD AUTO: 7.8 % — SIGNIFICANT CHANGE UP (ref 0–8)
ESTIMATED AVERAGE GLUCOSE: 272 MG/DL — HIGH (ref 68–114)
GLUCOSE BLDC GLUCOMTR-MCNC: 280 MG/DL — HIGH (ref 70–99)
GLUCOSE BLDC GLUCOMTR-MCNC: 319 MG/DL — HIGH (ref 70–99)
GLUCOSE SERPL-MCNC: 286 MG/DL — HIGH (ref 70–99)
HCT VFR BLD CALC: 42.5 % — SIGNIFICANT CHANGE UP (ref 42–52)
HCV AB S/CO SERPL IA: 0.03 COI — SIGNIFICANT CHANGE UP
HCV AB SERPL-IMP: SIGNIFICANT CHANGE UP
HDLC SERPL-MCNC: 42 MG/DL — SIGNIFICANT CHANGE UP
HGB BLD-MCNC: 14 G/DL — SIGNIFICANT CHANGE UP (ref 14–18)
IMM GRANULOCYTES NFR BLD AUTO: 0.4 % — HIGH (ref 0.1–0.3)
LIPID PNL WITH DIRECT LDL SERPL: 177 MG/DL — HIGH
LYMPHOCYTES # BLD AUTO: 2.37 K/UL — SIGNIFICANT CHANGE UP (ref 1.2–3.4)
LYMPHOCYTES # BLD AUTO: 29.8 % — SIGNIFICANT CHANGE UP (ref 20.5–51.1)
MCHC RBC-ENTMCNC: 26.6 PG — LOW (ref 27–31)
MCHC RBC-ENTMCNC: 32.9 G/DL — SIGNIFICANT CHANGE UP (ref 32–37)
MCV RBC AUTO: 80.8 FL — SIGNIFICANT CHANGE UP (ref 80–94)
MONOCYTES # BLD AUTO: 0.96 K/UL — HIGH (ref 0.1–0.6)
MONOCYTES NFR BLD AUTO: 12.1 % — HIGH (ref 1.7–9.3)
NEUTROPHILS # BLD AUTO: 3.89 K/UL — SIGNIFICANT CHANGE UP (ref 1.4–6.5)
NEUTROPHILS NFR BLD AUTO: 48.9 % — SIGNIFICANT CHANGE UP (ref 42.2–75.2)
NON HDL CHOLESTEROL: 246 MG/DL — HIGH
NRBC # BLD: 0 /100 WBCS — SIGNIFICANT CHANGE UP (ref 0–0)
PLATELET # BLD AUTO: 314 K/UL — SIGNIFICANT CHANGE UP (ref 130–400)
POTASSIUM SERPL-MCNC: 4.8 MMOL/L — SIGNIFICANT CHANGE UP (ref 3.5–5)
POTASSIUM SERPL-SCNC: 4.8 MMOL/L — SIGNIFICANT CHANGE UP (ref 3.5–5)
PROT SERPL-MCNC: 7.3 G/DL — SIGNIFICANT CHANGE UP (ref 6–8)
RBC # BLD: 5.26 M/UL — SIGNIFICANT CHANGE UP (ref 4.7–6.1)
RBC # FLD: 13.9 % — SIGNIFICANT CHANGE UP (ref 11.5–14.5)
SODIUM SERPL-SCNC: 132 MMOL/L — LOW (ref 135–146)
TRIGL SERPL-MCNC: 346 MG/DL — HIGH
TROPONIN T SERPL-MCNC: <0.01 NG/ML — SIGNIFICANT CHANGE UP
WBC # BLD: 7.95 K/UL — SIGNIFICANT CHANGE UP (ref 4.8–10.8)
WBC # FLD AUTO: 7.95 K/UL — SIGNIFICANT CHANGE UP (ref 4.8–10.8)

## 2022-07-01 PROCEDURE — 99236 HOSP IP/OBS SAME DATE HI 85: CPT

## 2022-07-01 PROCEDURE — 93306 TTE W/DOPPLER COMPLETE: CPT | Mod: 26

## 2022-07-01 RX ORDER — METOPROLOL TARTRATE 50 MG
1 TABLET ORAL
Qty: 0 | Refills: 0 | DISCHARGE

## 2022-07-01 RX ORDER — EZETIMIBE 10 MG/1
1 TABLET ORAL
Qty: 0 | Refills: 0 | DISCHARGE

## 2022-07-01 RX ORDER — TAMSULOSIN HYDROCHLORIDE 0.4 MG/1
1 CAPSULE ORAL
Qty: 30 | Refills: 0
Start: 2022-07-01 | End: 2022-07-30

## 2022-07-01 RX ORDER — INSULIN LISPRO 100 [IU]/ML
30 INJECTION, SUSPENSION SUBCUTANEOUS
Qty: 2 | Refills: 0
Start: 2022-07-01 | End: 2022-07-30

## 2022-07-01 RX ORDER — ASPIRIN/CALCIUM CARB/MAGNESIUM 324 MG
1 TABLET ORAL
Qty: 30 | Refills: 0
Start: 2022-07-01 | End: 2022-07-30

## 2022-07-01 RX ORDER — AMLODIPINE BESYLATE 2.5 MG/1
0 TABLET ORAL
Qty: 0 | Refills: 0 | DISCHARGE

## 2022-07-01 RX ORDER — TAMSULOSIN HYDROCHLORIDE 0.4 MG/1
1 CAPSULE ORAL
Qty: 0 | Refills: 0 | DISCHARGE

## 2022-07-01 RX ORDER — LISINOPRIL/HYDROCHLOROTHIAZIDE 10-12.5 MG
1 TABLET ORAL
Qty: 0 | Refills: 0 | DISCHARGE

## 2022-07-01 RX ORDER — ATORVASTATIN CALCIUM 80 MG/1
40 TABLET, FILM COATED ORAL AT BEDTIME
Refills: 0 | Status: DISCONTINUED | OUTPATIENT
Start: 2022-07-01 | End: 2022-07-01

## 2022-07-01 RX ORDER — ATORVASTATIN CALCIUM 80 MG/1
1 TABLET, FILM COATED ORAL
Qty: 30 | Refills: 0
Start: 2022-07-01 | End: 2022-07-30

## 2022-07-01 RX ORDER — METOPROLOL TARTRATE 50 MG
1 TABLET ORAL
Qty: 30 | Refills: 0
Start: 2022-07-01 | End: 2022-07-30

## 2022-07-01 RX ORDER — AMLODIPINE BESYLATE 2.5 MG/1
1 TABLET ORAL
Qty: 30 | Refills: 0
Start: 2022-07-01 | End: 2022-07-30

## 2022-07-01 RX ORDER — PIOGLITAZONE HYDROCHLORIDE 15 MG/1
1 TABLET ORAL
Qty: 0 | Refills: 0 | DISCHARGE

## 2022-07-01 RX ORDER — DULAGLUTIDE 4.5 MG/.5ML
0 INJECTION, SOLUTION SUBCUTANEOUS
Qty: 0 | Refills: 0 | DISCHARGE

## 2022-07-01 RX ORDER — CLOPIDOGREL BISULFATE 75 MG/1
1 TABLET, FILM COATED ORAL
Qty: 0 | Refills: 0 | DISCHARGE

## 2022-07-01 RX ORDER — ASPIRIN/CALCIUM CARB/MAGNESIUM 324 MG
1 TABLET ORAL
Qty: 0 | Refills: 0 | DISCHARGE

## 2022-07-01 RX ORDER — ISOPROPYL ALCOHOL, BENZOCAINE .7; .06 ML/ML; ML/ML
1 SWAB TOPICAL
Qty: 100 | Refills: 1
Start: 2022-07-01 | End: 2022-08-19

## 2022-07-01 RX ADMIN — Medication 650 MILLIGRAM(S): at 14:51

## 2022-07-01 RX ADMIN — Medication 650 MILLIGRAM(S): at 06:56

## 2022-07-01 RX ADMIN — Medication 5 UNIT(S): at 14:51

## 2022-07-01 RX ADMIN — HEPARIN SODIUM 5000 UNIT(S): 5000 INJECTION INTRAVENOUS; SUBCUTANEOUS at 05:54

## 2022-07-01 RX ADMIN — Medication 650 MILLIGRAM(S): at 05:54

## 2022-07-01 RX ADMIN — Medication 81 MILLIGRAM(S): at 11:39

## 2022-07-01 RX ADMIN — AMLODIPINE BESYLATE 10 MILLIGRAM(S): 2.5 TABLET ORAL at 05:54

## 2022-07-01 RX ADMIN — Medication 5 UNIT(S): at 08:17

## 2022-07-01 RX ADMIN — Medication 4: at 14:51

## 2022-07-01 RX ADMIN — Medication 12.5 MILLIGRAM(S): at 05:54

## 2022-07-01 RX ADMIN — Medication 3: at 08:17

## 2022-07-01 NOTE — DISCHARGE NOTE PROVIDER - ATTENDING DISCHARGE PHYSICAL EXAMINATION:
Attending attestation  Attending DC note  Pt seen and examined at bedside. No cp or sob. Endo reccs appreciated, follow up outpt [facility]. Follow up primary care physician would benefit on ARB. FOllow up primary care physician was given BP cuff script  vitals, labs, exam stable  Hospital course as above.  Plan dw pt and agreed to plan  Medically cleared for DC. Med recc completed.  DW resident. Spent 32 mins on case

## 2022-07-01 NOTE — DISCHARGE NOTE PROVIDER - NSDCCPCAREPLAN_GEN_ALL_CORE_FT
PRINCIPAL DISCHARGE DIAGNOSIS  Diagnosis: Elevated blood sugar  Assessment and Plan of Treatment: Hyperglycemia  Hyperglycemia occurs when the glucose (sugar) level in your blood is too high. Symptoms include increased urination, increased thirst, a dry mouth, or changes in appetite. If started on a medication, take exactly as prescribed by your health care professional. Maintain a healthy lifestyle and follow up with your primary care physician.  SEEK IMMEDIATE MEDICAL CARE IF YOU HAVE ANY OF THE FOLLOWING SYMPTOMS: shortness of breath, change in mental status, nausea or vomiting, fruity smell to your breath, or any signs of dehydration.        SECONDARY DISCHARGE DIAGNOSES  Diagnosis: Elevated blood pressure reading  Assessment and Plan of Treatment: You have been diagnosed with Hypertension, a medical term describing blood pressure above the normal range. This means the force of blood against your artery walls is too strong. It also means your heart is working hard to move blood. High blood pressure usually has no symptoms, but over time, it can damage your heart, blood vessels, eyes, kidneys, and other organs. With help from your doctor, you can manage your blood pressure and protect your health.   Please make sure to follow the following instructions:  - Take your blood pressure medication as prescribed. Do not skip doses. If you forgot or you skipped a dose talk to your doctor for advise on when to take the next dose   - Check your blood pressure every day with a cuff. Write down the values and present them to your doctor at the next visit. If the values are persistently high your medication may need to be re-adjusted   - Avoid excessive salt in your diet as salt help the build-up of excessive fluid in your body which can increase the blood pressure   - Maintain a healthy weight and diet. being overweight is a rsk factor for hypertension and losing weight can lower your blood pressure   - Avoid smoking and excessive alcohol intake   - Call 911 or report to the emergency department if your blood pressure is very high and you have severe headache, blurry vision, weakness or numbness, slurred speech, vertigo or chest pain.    Diagnosis: Chest pain  Assessment and Plan of Treatment:      PRINCIPAL DISCHARGE DIAGNOSIS  Diagnosis: Elevated blood sugar  Assessment and Plan of Treatment: WE HAVE PRESCRIBED YOUR DIABETIC MEDICATIONS AT [facility]. PLEASE FOLLOW UP AT THE [facility] AT THE [facility] TO ESTABLISH CARE. YOU WILL NEED FOLLOW UP WITH THE ENDOCRINOLOGIST SPECIALIST TO MANAGE YOUR DIABETES.   PLEASE FOLLOW UP WITH HOME CARE ON HOW TO USE YOUR DIABETIC EQUIPMENT.   Hyperglycemia occurs when the glucose (sugar) level in your blood is too high. Symptoms include increased urination, increased thirst, a dry mouth, or changes in appetite. If started on a medication, take exactly as prescribed by your health care professional. Maintain a healthy lifestyle and follow up with your primary care physician.  SEEK IMMEDIATE MEDICAL CARE IF YOU HAVE ANY OF THE FOLLOWING SYMPTOMS: shortness of breath, change in mental status, nausea or vomiting, fruity smell to your breath, or any signs of dehydration.        SECONDARY DISCHARGE DIAGNOSES  Diagnosis: Elevated blood pressure reading  Assessment and Plan of Treatment: PLEASE SEE YOUR [facility] CARE DOCTOR TO ADDRESS WHEN IT IS SAFE ENOUGH TO RESTART YOUR LOSARTAN. YOU WOULD HIGHLY BENEFIT FROM THIS MEDICATION GIVEN YOUR HEART HISTORY AND DIABETES. PLEASE ASK YOUR DOCTOR ABOUT THIS MEDICATION. WE ARE CONTINUING YOUR METOPROLOL AND AMLODIPINE DURING [facility] HOSPITAL STAY. PLEASE FOLLOW UP WITH YOUR DOCTOR REGARDING YOUR PLAVIX MEDICATION. PLEASE  THIS PRESCRIPTION AT THE [facility].   You have been diagnosed with Hypertension, a medical term describing blood pressure above the normal range. This means the force of blood against your artery walls is too strong. It also means your heart is working hard to move blood. High blood pressure usually has no symptoms, but over time, it can damage your heart, blood vessels, eyes, kidneys, and other organs. With help from your doctor, you can manage your blood pressure and protect your health.   Please make sure to follow the following instructions:  - Take your blood pressure medication as prescribed. Do not skip doses. If you forgot or you skipped a dose talk to your doctor for advise on when to take the next dose   - Check your blood pressure every day with a cuff. Write down the values and present them to your doctor at the next visit. If the values are persistently high your medication may need to be re-adjusted   - Avoid excessive salt in your diet as salt help the build-up of excessive fluid in your body which can increase the blood pressure   - Maintain a healthy weight and diet. being overweight is a rsk factor for hypertension and losing weight can lower your blood pressure   - Avoid smoking and excessive alcohol intake   - Call 911 or report to the emergency department if your blood pressure is very high and you have severe headache, blurry vision, weakness or numbness, slurred speech, vertigo or chest pain.    Diagnosis: Chest pain  Assessment and Plan of Treatment:

## 2022-07-01 NOTE — DISCHARGE NOTE PROVIDER - NSDCMRMEDTOKEN_GEN_ALL_CORE_FT
amLODIPine 10 mg oral tablet: orally once a day  amoxicillin 875 mg oral tablet: 1 tab(s) orally 2 times a day   Aspir 81 oral delayed release tablet: 1 tab(s) orally once a day  Azithromycin 5 Day Dose Pack 250 mg oral tablet: 1 packet(s) orally once a day MDD:please dispense one dose pack and take as directed  ezetimibe 10 mg oral tablet: 1 tab(s) orally once a day  Flomax 0.4 mg oral capsule: 1 cap(s) orally once a day  HumaLOG Mix 75/25 KwikPen subcutaneous suspension: 30 unit(s) subcutaneously 2 times a day   lisinopril-hydroCHLOROthiazide 10 mg-12.5 mg oral tablet: 1 tab(s) orally once a day  pantoprazole 40 mg oral delayed release tablet: 1 tab(s) orally once a day   pioglitazone 15 mg oral tablet: 1 tab(s) orally once a day  Plavix 75 mg oral tablet: 1 tab(s) orally once a day  predniSONE 20 mg oral tablet: 2 tab(s) orally once a day   Toprol-XL 25 mg oral tablet, extended release: 1 tab(s) orally once a day  Triseba: 20 unit(s) daily  Trulicity Pen 0.75 mg/0.5 mL subcutaneous solution: subcutaneous once a week   alcohol swabs : Apply topically to affected area 4 times a day   amLODIPine 10 mg oral tablet: 1 tab(s) orally once a day   Aspir 81 oral delayed release tablet: 1 tab(s) orally once a day  atorvastatin 40 mg oral tablet: 1 tab(s) orally once a day (at bedtime)  Flomax 0.4 mg oral capsule: 1 cap(s) orally once a day  glucometer (per patient&#x27;s insurance): Test blood sugars four times a day. Dispense #1 glucometer.  HumaLOG Mix 75/25 KwikPen subcutaneous suspension: 30 unit(s) subcutaneously 2 times a day   Insulin Pen Needles, 4mm: 1 application subcutaneously 4 times a day. ** Use with insulin pen **   lancets: 1 application subcutaneously 4 times a day   test strips (per patient&#x27;s insurance): 1 application subcutaneously 4 times a day. ** Compatible with patient&#x27;s glucometer **  Toprol-XL 25 mg oral tablet, extended release: 1 tab(s) orally once a day

## 2022-07-01 NOTE — DISCHARGE NOTE PROVIDER - NSDCFUADDINST_GEN_ALL_CORE_FT
Please take prescribed medications per instructions and schedule to ensure adequate control of blood pressure and blood glucose.

## 2022-07-01 NOTE — DISCHARGE NOTE NURSING/CASE MANAGEMENT/SOCIAL WORK - PATIENT PORTAL LINK FT
You can access the FollowMyHealth Patient Portal offered by [facility] by registering at the following website: [email or web address]. By joining [employer]’s FollowMyHealth portal, you will also be able to view your health information using other applications (apps) compatible with our system.

## 2022-07-01 NOTE — CONSULT NOTE ADULT - ASSESSMENT
71 M with hx of CAD s/ PCI 5 y ago, CKD, DMII, CHF, GERD, HTN, DLD, and gout presents for evaluation of elevated BP and glucose at home. Patient has been in [place] since 2019 and was getting his medicine there but ran out a week ago. Patient now living with daughter. Both patient and daughter do not know the names of the medications he was on but said he took insulin bid. Daughter threw out empty pill bottles. Patient saw Dr. Luna in 2019 where he was taking tresiba 60U qhs and Trulicity 1.5 qwk.     #Uncontrolled DM type II   - a1c 11.1%  - meds from 2019: Tresiba 60U qhs and trulicity 1.5   - in hospital insulin regimen: Lantus 15 and lispro 5 tid, ISS  - sugars still high 200s   - would adjust insulin to lantus 24U qhs and lispro 8U tid, increase ISS   - f/u outpatient in [facility]     Incomplete note - to be seen by attending  71 M with hx of CAD s/ PCI 5 y ago, CKD, DMII, CHF, GERD, HTN, DLD, and gout presents for evaluation of elevated BP and glucose at home. Patient has been in [place] since 2019 and was getting his medicine there but ran out a week ago. Patient now living with daughter. Both patient and daughter do not know the names of the medications he was on but said he took insulin bid - 30AM and 20 PM. Daughter threw out empty pill bottles. Patient saw Dr. Luna in 2019 where he was taking tresiba 60U qhs and Trulicity 1.5 qwk.     #Uncontrolled DM type II   - a1c 11.1%  - meds from 2019: Tresiba 60U qhs and trulicity 1.5   - Patient states he was taking insulin 30U AM and 20U PM   - in hospital insulin regimen: Lantus 15 and lispro 5 tid, ISS  - sugars still high 200s   - would adjust insulin to lantus 24U qhs and lispro 8U tid, increase ISS   - f/u outpatient in [facility]     Incomplete note - to be seen by attending  71 M with hx of CAD s/ PCI 5 y ago, CKD, DMII, CHF, GERD, HTN, DLD, and gout presents for evaluation of elevated BP and glucose at home. Patient has been in [place] since 2019 and was getting his medicine there but ran out a week ago. Patient now living with daughter. Both patient and daughter do not know the names of the medications he was on but said he took insulin bid - 30AM and 20 PM. Daughter threw out empty pill bottles. Patient saw Dr. Luna in 2019 where he was taking tresiba 60U qhs and Trulicity 1.5 qwk.     #Uncontrolled DM type II   - a1c 11.1%  - meds from 2019: Tresiba 60U qhs and trulicity 1.5   - Patient states he was taking insulin 30U AM and 20U PM   - in hospital insulin regimen: Lantus 15 and lispro 5 tid, ISS  - sugars still high 200s   - would adjust insulin to lantus 24U qhs and lispro 8U tid, increase ISS as inpatient   - discharge on either novolog mix 70/30 or humalog mix 75/25 30units bid before breakfast and before dinner   - f/u outpatient in [facility] clinic

## 2022-07-01 NOTE — DISCHARGE NOTE PROVIDER - PROVIDER TOKENS
PROVIDER:[TOKEN:[[id number]],ESTABLISHEDPATIENT:[T]] PROVIDER:[TOKEN:[[id number]],ESTABLISHEDPATIENT:[T]],PROVIDER:[TOKEN:[[id number]],FOLLOWUP:[1 week]]

## 2022-07-01 NOTE — DISCHARGE NOTE PROVIDER - HOSPITAL COURSE
Pt presented to the ED with hypertensive urgency (SBP ranging from 190-240) and an elevated blood sugar of 365 and was admitted for management of the same.     In the ED, pt received labetalol and lopressor for blood pressure control and insulin for blood sugar control.     Pt's hospital course included work-up to rule out life-threatening causes of hypertensive urgency as well as administration of nifedipine, amlodipine and metoprolol for reduction in blood pressure. Pt has been stabilized for discharged on antihypertensives and antidiabetics, and will be advised to follow-up with his primary care. Pt presented to the ED with hypertensive urgency (SBP ranging from 190-240) and an elevated blood sugar of 365 and was admitted for management of the same.     In the ED, pt received labetalol and lopressor for blood pressure control and insulin for blood sugar control.     Pt's hospital course included work-up to rule out life-threatening causes of hypertensive urgency as well as administration of nifedipine, amlodipine and metoprolol for reduction in blood pressure. Pt has been stabilized for discharged on antihypertensives and antidiabetics, and will be advised to follow-up with his primary care.     The following needs to be addressed outpatient  1) plavix was discontinued during this hospital admission- he is unsure of his history/when he last took medication. Patient may have had CAD s/p PCI according to medical records.   2) losartan-hctz was held during his hospital stay (last med rec was 2019). Please monitor patient outpatient when he is stable enough to continue back losartan. Because of his hx of DM and CAD, he would benefit from ACEI/ARB treatment. Metoprolol and amlodipine was continued during his hospital stay.  3) He will need outpatient follow up with endocrinology as he is extremely noncompliant with his diabetic medications. Follow up will be important in updating his medications.

## 2022-07-01 NOTE — CONSULT NOTE ADULT - SUBJECTIVE AND OBJECTIVE BOX
HPI:  71 M with hx of CAD s/ PCI 5 y ago, CKD, DMII, CHF, GERD, HTN, DLD, and gout presents for evaluation of elevated BP and glucose at home. The patient lives in [place] where he gets his meds, came back and ran out of his medications week ago. Today he checked his BP and fingerstick glucose at home and they were high so he came to the ED. He some left sided chest pain that he described as pinching, lasted a few hours and was not related to rest or exertion and resolved by itself. He denies headache, blurry vision, dizziness, weakness, SOB, palpitations, nausea, vomiting, leg swelling, dysuria. The patient does not know the names of his medications, med rec unable to be verified.    ED VS: T(F): , Max: 98.8 HR:  (80 - 99) BP:  (195/91 - 239/128) RR: 18 SpO2:  (96% - 100%)    Labs: Glucose 365, K 5.6, Cr 1.6 (baseline), troponin negative x1, BHB 0.2  Pt received labetalol 10 mg IV, Lopressor, insulin IV 10 units (30 Jun 2022 18:59)      PAST MEDICAL & SURGICAL HISTORY  CAD (coronary artery disease)    CKD (chronic kidney disease), stage III    Diabetes    CHF (congestive heart failure)    GERD (gastroesophageal reflux disease)    HTN (hypertension)    Dyslipidemia    Gout    H/O foot surgery        FAMILY HISTORY:  FAMILY HISTORY:  Family history of hypertension (Father, Mother)    Family history of diabetes mellitus (Sibling)    Family history of heart disease (Father)        SOCIAL HISTORY:  []smoker  []Alcohol  []Drug    ALLERGIES:  No Known Allergies      MEDICATIONS:  MEDICATIONS  (STANDING):  amLODIPine   Tablet 10 milliGRAM(s) Oral daily  aspirin enteric coated 81 milliGRAM(s) Oral daily  atorvastatin 40 milliGRAM(s) Oral at bedtime  heparin   Injectable 5000 Unit(s) SubCutaneous every 12 hours  insulin glargine Injectable (LANTUS) 15 Unit(s) SubCutaneous at bedtime  insulin lispro (ADMELOG) corrective regimen sliding scale   SubCutaneous three times a day before meals  insulin lispro Injectable (ADMELOG) 5 Unit(s) SubCutaneous three times a day before meals  metoprolol tartrate 12.5 milliGRAM(s) Oral two times a day    MEDICATIONS  (PRN):  acetaminophen     Tablet .. 650 milliGRAM(s) Oral every 6 hours PRN Temp greater or equal to 38C (100.4F), Mild Pain (1 - 3)  aluminum hydroxide/magnesium hydroxide/simethicone Suspension 30 milliLiter(s) Oral every 4 hours PRN Dyspepsia  melatonin 3 milliGRAM(s) Oral at bedtime PRN Insomnia  ondansetron Injectable 4 milliGRAM(s) IV Push every 8 hours PRN Nausea and/or Vomiting      HOME MEDICATIONS:  Home Medications:  amLODIPine 10 mg oral tablet: orally once a day (20 Nov 2018 17:29)  Aspir 81 oral delayed release tablet: 1 tab(s) orally once a day (20 Nov 2018 17:29)  ezetimibe 10 mg oral tablet: 1 tab(s) orally once a day (20 Nov 2018 17:29)  Flomax 0.4 mg oral capsule: 1 cap(s) orally once a day (20 Nov 2018 17:29)  lisinopril-hydroCHLOROthiazide 10 mg-12.5 mg oral tablet: 1 tab(s) orally once a day (20 Nov 2018 17:29)  pioglitazone 15 mg oral tablet: 1 tab(s) orally once a day (20 Nov 2018 17:29)  Plavix 75 mg oral tablet: 1 tab(s) orally once a day (20 Nov 2018 17:29)  Toprol-XL 25 mg oral tablet, extended release: 1 tab(s) orally once a day (20 Nov 2018 17:29)  Triseba: 20 unit(s) daily (20 Nov 2018 17:29)  Trulicity Pen 0.75 mg/0.5 mL subcutaneous solution: subcutaneous once a week (20 Nov 2018 17:29)      VITALS:   T(F): 97.2 (07-01 @ 07:44), Max: 98.8 (06-30 @ 12:14)  HR: 76 (07-01 @ 07:44) (73 - 99)  BP: 109/74 (07-01 @ 07:44) (109/74 - 239/128)  BP(mean): 131 (06-30 @ 19:42) (131 - 131)  RR: 18 (07-01 @ 07:44) (16 - 20)  SpO2: 96% (07-01 @ 07:44) (96% - 100%)    I&O's Summary      REVIEW OF SYSTEMS:  CONSTITUTIONAL: No weakness, fevers or chills  EYES: No visual changes  ENT: No vertigo or throat pain   NECK: No pain or stiffness  RESPIRATORY: No cough, wheezing, hemoptysis; No shortness of breath  CARDIOVASCULAR: No chest pain or palpitations  GASTROINTESTINAL: No abdominal or epigastric pain.   GENITOURINARY: No Polyuria  NEUROLOGICAL:  No tremors, no Weakness or numbness  SKIN: No itching, no rashes  MSK: no joint pain    PHYSICAL EXAM:  GENERAL: Patient is awake , alert and oriented, not in acute distress  EYES: No proptosis, no lid lag  NECK: Supple   LUNGS: Clear to auscultation bilaterally   CARDIOVASCULAR: S1/S2 present, RRR , no murmurs or rubs  ABD: Soft, non-tender, non-distended, +BS  EXT: No DREW  SKIN: No abdominal striae  NEURO: No tremors      LABS:                        14.0   7.95  )-----------( 314      ( 01 Jul 2022 06:19 )             42.5     07-01    132<L>  |  95<L>  |  25<H>  ----------------------------<  286<H>  4.8   |  24  |  1.5    Ca    9.4      01 Jul 2022 06:19  Mg     2.0     06-30    TPro  7.3  /  Alb  4.1  /  TBili  <0.2  /  DBili  x   /  AST  16  /  ALT  20  /  AlkPhos  100  07-01      Troponin T, Serum: <0.01 ng/mL (07-01-22 @ 00:16)  Troponin T, Serum: <0.01 ng/mL (06-30-22 @ 21:02)  Troponin T, Serum: <0.01 ng/mL (06-30-22 @ 13:52)    CARDIAC MARKERS ( 01 Jul 2022 00:16 )  x     / <0.01 ng/mL / x     / x     / x      CARDIAC MARKERS ( 30 Jun 2022 21:02 )  x     / <0.01 ng/mL / x     / x     / x      CARDIAC MARKERS ( 30 Jun 2022 13:52 )  x     / <0.01 ng/mL / x     / x     / x          07-01 Chol 288<H> LDL -- HDL 42 Trig 346<H>  POCT Blood Glucose.: 280 mg/dL (07-01-22 @ 08:05)  POCT Blood Glucose.: 395 mg/dL (06-30-22 @ 21:35)  POCT Blood Glucose.: 357 mg/dL (06-30-22 @ 18:14)  POCT Blood Glucose.: 365 mg/dL (06-30-22 @ 12:18)       HPI:  71 M with hx of CAD s/ PCI 5 y ago, CKD, DMII, CHF, GERD, HTN, DLD, and gout presents for evaluation of elevated BP and glucose at home. The patient lives in [place] where he gets his meds, came back and ran out of his medications week ago. Today he checked his BP and fingerstick glucose at home and they were high so he came to the ED. He some left sided chest pain that he described as pinching, lasted a few hours and was not related to rest or exertion and resolved by itself. He denies headache, blurry vision, dizziness, weakness, SOB, palpitations, nausea, vomiting, leg swelling, dysuria. The patient does not know the names of his medications, med rec unable to be verified.    ED VS: T(F): , Max: 98.8 HR:  (80 - 99) BP:  (195/91 - 239/128) RR: 18 SpO2:  (96% - 100%)    Labs: Glucose 365, K 5.6, Cr 1.6 (baseline), troponin negative x1, BHB 0.2  Pt received labetalol 10 mg IV, Lopressor, insulin IV 10 units (30 Jun 2022 18:59)      PAST MEDICAL & SURGICAL HISTORY  CAD (coronary artery disease)    CKD (chronic kidney disease), stage III    Diabetes    CHF (congestive heart failure)    GERD (gastroesophageal reflux disease)    HTN (hypertension)    Dyslipidemia    Gout    H/O foot surgery        FAMILY HISTORY:  FAMILY HISTORY:  Family history of hypertension (Father, Mother)    Family history of diabetes mellitus (Sibling)    Family history of heart disease (Father)        SOCIAL HISTORY:  []smoker  []Alcohol  []Drug    ALLERGIES:  No Known Allergies      MEDICATIONS:  MEDICATIONS  (STANDING):  amLODIPine   Tablet 10 milliGRAM(s) Oral daily  aspirin enteric coated 81 milliGRAM(s) Oral daily  atorvastatin 40 milliGRAM(s) Oral at bedtime  heparin   Injectable 5000 Unit(s) SubCutaneous every 12 hours  insulin glargine Injectable (LANTUS) 15 Unit(s) SubCutaneous at bedtime  insulin lispro (ADMELOG) corrective regimen sliding scale   SubCutaneous three times a day before meals  insulin lispro Injectable (ADMELOG) 5 Unit(s) SubCutaneous three times a day before meals  metoprolol tartrate 12.5 milliGRAM(s) Oral two times a day    MEDICATIONS  (PRN):  acetaminophen     Tablet .. 650 milliGRAM(s) Oral every 6 hours PRN Temp greater or equal to 38C (100.4F), Mild Pain (1 - 3)  aluminum hydroxide/magnesium hydroxide/simethicone Suspension 30 milliLiter(s) Oral every 4 hours PRN Dyspepsia  melatonin 3 milliGRAM(s) Oral at bedtime PRN Insomnia  ondansetron Injectable 4 milliGRAM(s) IV Push every 8 hours PRN Nausea and/or Vomiting      HOME MEDICATIONS:  Home Medications:  amLODIPine 10 mg oral tablet: orally once a day (20 Nov 2018 17:29)  Aspir 81 oral delayed release tablet: 1 tab(s) orally once a day (20 Nov 2018 17:29)  ezetimibe 10 mg oral tablet: 1 tab(s) orally once a day (20 Nov 2018 17:29)  Flomax 0.4 mg oral capsule: 1 cap(s) orally once a day (20 Nov 2018 17:29)  lisinopril-hydroCHLOROthiazide 10 mg-12.5 mg oral tablet: 1 tab(s) orally once a day (20 Nov 2018 17:29)  pioglitazone 15 mg oral tablet: 1 tab(s) orally once a day (20 Nov 2018 17:29)  Plavix 75 mg oral tablet: 1 tab(s) orally once a day (20 Nov 2018 17:29)  Toprol-XL 25 mg oral tablet, extended release: 1 tab(s) orally once a day (20 Nov 2018 17:29)  Triseba: 20 unit(s) daily (20 Nov 2018 17:29)  Trulicity Pen 0.75 mg/0.5 mL subcutaneous solution: subcutaneous once a week (20 Nov 2018 17:29)      VITALS:   T(F): 97.2 (07-01 @ 07:44), Max: 98.8 (06-30 @ 12:14)  HR: 76 (07-01 @ 07:44) (73 - 99)  BP: 109/74 (07-01 @ 07:44) (109/74 - 239/128)  BP(mean): 131 (06-30 @ 19:42) (131 - 131)  RR: 18 (07-01 @ 07:44) (16 - 20)  SpO2: 96% (07-01 @ 07:44) (96% - 100%)    I&O's Summary      REVIEW OF SYSTEMS:  CONSTITUTIONAL: No weakness, fevers or chills  EYES: No visual changes  ENT: No vertigo or throat pain   NECK: No pain or stiffness  RESPIRATORY: No cough, wheezing, hemoptysis; No shortness of breath  CARDIOVASCULAR: No chest pain or palpitations  GASTROINTESTINAL: No abdominal or epigastric pain.   GENITOURINARY: + Polyuria  NEUROLOGICAL:  No tremors, no Weakness or numbness  SKIN: No itching, no rashes  MSK: no joint pain    PHYSICAL EXAM:  GENERAL: Patient is awake , alert and oriented, not in acute distress  EYES: No proptosis, no lid lag  NECK: Supple   LUNGS: Clear to auscultation bilaterally   CARDIOVASCULAR: S1/S2 present, RRR , no murmurs or rubs  ABD: Soft, non-tender, non-distended, +BS  EXT: No DREW  SKIN: No abdominal striae  NEURO: No tremors      LABS:                        14.0   7.95  )-----------( 314      ( 01 Jul 2022 06:19 )             42.5     07-01    132<L>  |  95<L>  |  25<H>  ----------------------------<  286<H>  4.8   |  24  |  1.5    Ca    9.4      01 Jul 2022 06:19  Mg     2.0     06-30    TPro  7.3  /  Alb  4.1  /  TBili  <0.2  /  DBili  x   /  AST  16  /  ALT  20  /  AlkPhos  100  07-01      Troponin T, Serum: <0.01 ng/mL (07-01-22 @ 00:16)  Troponin T, Serum: <0.01 ng/mL (06-30-22 @ 21:02)  Troponin T, Serum: <0.01 ng/mL (06-30-22 @ 13:52)    CARDIAC MARKERS ( 01 Jul 2022 00:16 )  x     / <0.01 ng/mL / x     / x     / x      CARDIAC MARKERS ( 30 Jun 2022 21:02 )  x     / <0.01 ng/mL / x     / x     / x      CARDIAC MARKERS ( 30 Jun 2022 13:52 )  x     / <0.01 ng/mL / x     / x     / x          07-01 Chol 288<H> LDL -- HDL 42 Trig 346<H>  POCT Blood Glucose.: 280 mg/dL (07-01-22 @ 08:05)  POCT Blood Glucose.: 395 mg/dL (06-30-22 @ 21:35)  POCT Blood Glucose.: 357 mg/dL (06-30-22 @ 18:14)  POCT Blood Glucose.: 365 mg/dL (06-30-22 @ 12:18)

## 2022-07-01 NOTE — CONSULT NOTE ADULT - ATTENDING COMMENTS
Uncontrolled type 2 DM secondary to lack of medication (on insulin bid at home).  Can go home on Novolog Mix 70/30 (or Humalog Mix 75/25-whichever is covered)-30 units bid ac pre-breakfast and pre-dinner.  Can follow-up in Endocrine clinic [phone] in next 2-3 weeks.  (While in hospital increase Lantus to 24 units qHs and Admelog to 8 units ac)

## 2022-07-01 NOTE — DISCHARGE NOTE PROVIDER - NSDCFUSCHEDAPPT_GEN_ALL_CORE_FT
[facility] Physician [facility]  GERIATRICS [address]  Scheduled Appointment: 07/13/2022

## 2022-07-01 NOTE — DISCHARGE NOTE NURSING/CASE MANAGEMENT/SOCIAL WORK - NSDCPEFALRISK_GEN_ALL_CORE
For information on Fall & Injury Prevention, visit: [email or web address] OR  [email or web address] OR  https://www.cdc.gov/steadi/patient.html

## 2022-07-01 NOTE — DISCHARGE NOTE PROVIDER - CARE PROVIDER_API CALL
Sonia Luna)  Geriatric Medicine; Internal Medicine  [address] [place] [address]  Phone: [phone]  Fax: [phone]  Established Patient  Follow Up Time:    Sonia Luna)  Geriatric Medicine; Internal Medicine  [address] [place] [address]  Phone: [phone]  Fax: [phone]  Established Patient  Follow Up Time:     Trae Buck)  EndocrinologyMetabDiabetes; Internal Medicine  [address] [place] [address]  Phone: [phone]  Fax: [phone]  Follow Up Time: 1 week

## 2022-07-01 NOTE — DISCHARGE NOTE PROVIDER - CARE PROVIDERS DIRECT ADDRESSES
,[email or web address]

## 2022-07-08 DIAGNOSIS — E11.22 TYPE 2 DIABETES MELLITUS WITH DIABETIC CHRONIC KIDNEY DISEASE: ICD-10-CM

## 2022-07-08 DIAGNOSIS — N18.30 CHRONIC KIDNEY DISEASE, STAGE 3 UNSPECIFIED: ICD-10-CM

## 2022-07-08 DIAGNOSIS — Z79.52 LONG TERM (CURRENT) USE OF SYSTEMIC STEROIDS: ICD-10-CM

## 2022-07-08 DIAGNOSIS — Z95.5 PRESENCE OF CORONARY ANGIOPLASTY IMPLANT AND GRAFT: ICD-10-CM

## 2022-07-08 DIAGNOSIS — Z91.14 PATIENT'S OTHER NONCOMPLIANCE WITH MEDICATION REGIMEN: ICD-10-CM

## 2022-07-08 DIAGNOSIS — Z79.02 LONG TERM (CURRENT) USE OF ANTITHROMBOTICS/ANTIPLATELETS: ICD-10-CM

## 2022-07-08 DIAGNOSIS — E87.5 HYPERKALEMIA: ICD-10-CM

## 2022-07-08 DIAGNOSIS — E78.5 HYPERLIPIDEMIA, UNSPECIFIED: ICD-10-CM

## 2022-07-08 DIAGNOSIS — I16.0 HYPERTENSIVE URGENCY: ICD-10-CM

## 2022-07-08 DIAGNOSIS — I13.0 HYPERTENSIVE HEART AND CHRONIC KIDNEY DISEASE WITH HEART FAILURE AND STAGE 1 THROUGH STAGE 4 CHRONIC KIDNEY DISEASE, OR UNSPECIFIED CHRONIC KIDNEY DISEASE: ICD-10-CM

## 2022-07-08 DIAGNOSIS — Z79.4 LONG TERM (CURRENT) USE OF INSULIN: ICD-10-CM

## 2022-07-08 DIAGNOSIS — I25.10 ATHEROSCLEROTIC HEART DISEASE OF NATIVE CORONARY ARTERY WITHOUT ANGINA PECTORIS: ICD-10-CM

## 2022-07-08 DIAGNOSIS — K21.9 GASTRO-ESOPHAGEAL REFLUX DISEASE WITHOUT ESOPHAGITIS: ICD-10-CM

## 2022-07-08 DIAGNOSIS — Z83.3 FAMILY HISTORY OF DIABETES MELLITUS: ICD-10-CM

## 2022-07-08 DIAGNOSIS — Z82.49 FAMILY HISTORY OF ISCHEMIC HEART DISEASE AND OTHER DISEASES OF THE CIRCULATORY SYSTEM: ICD-10-CM

## 2022-07-08 DIAGNOSIS — R07.9 CHEST PAIN, UNSPECIFIED: ICD-10-CM

## 2022-07-08 DIAGNOSIS — E11.65 TYPE 2 DIABETES MELLITUS WITH HYPERGLYCEMIA: ICD-10-CM

## 2022-07-08 DIAGNOSIS — Z87.891 PERSONAL HISTORY OF NICOTINE DEPENDENCE: ICD-10-CM

## 2022-07-20 ENCOUNTER — APPOINTMENT (OUTPATIENT)
Dept: ENDOCRINOLOGY | Facility: CLINIC | Age: 72
End: 2022-07-20

## 2022-07-20 ENCOUNTER — OUTPATIENT (OUTPATIENT)
Dept: OUTPATIENT SERVICES | Facility: HOSPITAL | Age: 72
LOS: 1 days | Discharge: HOME | End: 2022-07-20

## 2022-07-20 VITALS
WEIGHT: 195 LBS | BODY MASS INDEX: 28.88 KG/M2 | DIASTOLIC BLOOD PRESSURE: 99 MMHG | TEMPERATURE: 98.3 F | HEIGHT: 69 IN | SYSTOLIC BLOOD PRESSURE: 183 MMHG | OXYGEN SATURATION: 98 % | HEART RATE: 91 BPM

## 2022-07-20 DIAGNOSIS — Z98.890 OTHER SPECIFIED POSTPROCEDURAL STATES: Chronic | ICD-10-CM

## 2022-07-20 NOTE — HISTORY OF PRESENT ILLNESS
[FreeTextEntry1] : Pt is a 72 y/o M with DM, HTN, HLD, CKD, MI in 2017.\par  Has not been in endo clinic since 2019 because he has been out of the country due to cov[place], returned last  month. Here for diabetic meds\par Pt takes insulin 30u in the morning and 20u at night of long acting, not sure of the specific name. \par FS usually on the high side around 250.\par bp uncontrolled\par ran out of most of medications, lots of meds not given in [place]

## 2022-07-20 NOTE — PHYSICAL EXAM
[Alert] : alert [Well Nourished] : well nourished [Normal Sclera/Conjunctiva] : normal sclera/conjunctiva [Normal Outer Ear/Nose] : the ears and nose were normal in appearance [No Respiratory Distress] : no respiratory distress [No Accessory Muscle Use] : no accessory muscle use [Clear to Auscultation] : lungs were clear to auscultation bilaterally [Normal S1, S2] : normal S1 and S2 [Not Tender] : non-tender [Not Distended] : not distended [Soft] : abdomen soft

## 2022-07-20 NOTE — ASSESSMENT
[FreeTextEntry1] : Pt is a 70 y/o M here for DM. out of country for 3 years, back to re-establish care\par \par #DM\par -c/w insulin 30u in morning and 20u at night\par -restart trulicity\par -get labwork\par \par #HTN, uncontrolled\par -183/99\par -had not been on all meds while he was in [place]\par -restart amlodipine and lisinpril-HCTZ\par \par -renewed all prior home meds

## 2022-07-21 LAB
25(OH)D3 SERPL-MCNC: 32 NG/ML
ALBUMIN SERPL ELPH-MCNC: 4.4 G/DL
ALP BLD-CCNC: 90 U/L
ALT SERPL-CCNC: 19 U/L
ANION GAP SERPL CALC-SCNC: 13 MMOL/L
AST SERPL-CCNC: 18 U/L
BASOPHILS # BLD AUTO: 0.1 K/UL
BASOPHILS NFR BLD AUTO: 1.4 %
BILIRUB SERPL-MCNC: 0.2 MG/DL
BUN SERPL-MCNC: 31 MG/DL
CALCIUM SERPL-MCNC: 10.2 MG/DL
CHLORIDE SERPL-SCNC: 97 MMOL/L
CHOLEST SERPL-MCNC: 299 MG/DL
CO2 SERPL-SCNC: 23 MMOL/L
CREAT SERPL-MCNC: 1.6 MG/DL
EGFR: 46 ML/MIN/1.73M2
EOSINOPHIL # BLD AUTO: 0.53 K/UL
EOSINOPHIL NFR BLD AUTO: 7.2 %
ESTIMATED AVERAGE GLUCOSE: 283 MG/DL
GLUCOSE SERPL-MCNC: 283 MG/DL
HBA1C MFR BLD HPLC: 11.5 %
HCT VFR BLD CALC: 44.4 %
HDLC SERPL-MCNC: 48 MG/DL
HGB BLD-MCNC: 14.5 G/DL
IMM GRANULOCYTES NFR BLD AUTO: 0.3 %
LDLC SERPL CALC-MCNC: 183 MG/DL
LYMPHOCYTES # BLD AUTO: 2.78 K/UL
LYMPHOCYTES NFR BLD AUTO: 37.9 %
MAN DIFF?: NORMAL
MCHC RBC-ENTMCNC: 26.7 PG
MCHC RBC-ENTMCNC: 32.7 G/DL
MCV RBC AUTO: 81.8 FL
MONOCYTES # BLD AUTO: 0.6 K/UL
MONOCYTES NFR BLD AUTO: 8.2 %
NEUTROPHILS # BLD AUTO: 3.3 K/UL
NEUTROPHILS NFR BLD AUTO: 45 %
NONHDLC SERPL-MCNC: 251 MG/DL
PLATELET # BLD AUTO: 343 K/UL
POTASSIUM SERPL-SCNC: 5.5 MMOL/L
PROT SERPL-MCNC: 8.5 G/DL
RBC # BLD: 5.43 M/UL
RBC # FLD: 13.9 %
SODIUM SERPL-SCNC: 133 MMOL/L
TRIGL SERPL-MCNC: 338 MG/DL
TSH SERPL-ACNC: 3.12 UIU/ML
WBC # FLD AUTO: 7.33 K/UL

## 2022-07-22 LAB — PSA SERPL-MCNC: 3.33 NG/ML

## 2022-07-27 ENCOUNTER — OUTPATIENT (OUTPATIENT)
Dept: OUTPATIENT SERVICES | Facility: HOSPITAL | Age: 72
LOS: 1 days | Discharge: HOME | End: 2022-07-27

## 2022-07-27 ENCOUNTER — APPOINTMENT (OUTPATIENT)
Dept: GERIATRICS | Facility: CLINIC | Age: 72
End: 2022-07-27

## 2022-07-27 VITALS
OXYGEN SATURATION: 97 % | HEART RATE: 93 BPM | DIASTOLIC BLOOD PRESSURE: 78 MMHG | BODY MASS INDEX: 26.96 KG/M2 | HEIGHT: 69 IN | WEIGHT: 182 LBS | SYSTOLIC BLOOD PRESSURE: 177 MMHG

## 2022-07-27 DIAGNOSIS — I10 ESSENTIAL (PRIMARY) HYPERTENSION: ICD-10-CM

## 2022-07-27 DIAGNOSIS — I50.9 HEART FAILURE, UNSPECIFIED: ICD-10-CM

## 2022-07-27 DIAGNOSIS — N18.30 CHRONIC KIDNEY DISEASE, STAGE 3 UNSPECIFIED: ICD-10-CM

## 2022-07-27 DIAGNOSIS — I25.10 ATHEROSCLEROTIC HEART DISEASE OF NATIVE CORONARY ARTERY WITHOUT ANGINA PECTORIS: ICD-10-CM

## 2022-07-27 DIAGNOSIS — M25.50 PAIN IN UNSPECIFIED JOINT: ICD-10-CM

## 2022-07-27 DIAGNOSIS — Z98.890 OTHER SPECIFIED POSTPROCEDURAL STATES: Chronic | ICD-10-CM

## 2022-07-27 DIAGNOSIS — E78.5 HYPERLIPIDEMIA, UNSPECIFIED: ICD-10-CM

## 2022-07-27 DIAGNOSIS — E11.9 TYPE 2 DIABETES MELLITUS WITHOUT COMPLICATIONS: ICD-10-CM

## 2022-07-27 DIAGNOSIS — Z00.00 ENCOUNTER FOR GENERAL ADULT MEDICAL EXAMINATION WITHOUT ABNORMAL FINDINGS: ICD-10-CM

## 2022-07-27 DIAGNOSIS — N40.0 BENIGN PROSTATIC HYPERPLASIA WITHOUT LOWER URINARY TRACT SYMPTOMS: ICD-10-CM

## 2022-07-27 PROCEDURE — 99214 OFFICE O/P EST MOD 30 MIN: CPT | Mod: GC

## 2022-07-27 RX ORDER — FENOFIBRATE 145 MG/1
145 TABLET, COATED ORAL DAILY
Qty: 30 | Refills: 5 | Status: DISCONTINUED | COMMUNITY
Start: 2022-07-21 | End: 2022-07-27

## 2022-07-27 RX ORDER — PREDNISONE 20 MG/1
20 TABLET ORAL DAILY
Qty: 3 | Refills: 0 | Status: DISCONTINUED | COMMUNITY
Start: 2019-10-31 | End: 2022-07-27

## 2022-07-27 RX ORDER — EVOLOCUMAB 140 MG/ML
140 INJECTION, SOLUTION SUBCUTANEOUS
Qty: 1 | Refills: 11 | Status: DISCONTINUED | COMMUNITY
Start: 2019-12-03 | End: 2022-07-27

## 2022-07-27 RX ORDER — EZETIMIBE 10 MG/1
10 TABLET ORAL DAILY
Qty: 90 | Refills: 3 | Status: DISCONTINUED | COMMUNITY
Start: 2019-10-31 | End: 2022-07-27

## 2022-07-27 RX ORDER — POLYETHYLENE GLYCOL 3350 AND ELECTROLYTES WITH LEMON FLAVOR 236; 22.74; 6.74; 5.86; 2.97 G/4L; G/4L; G/4L; G/4L; G/4L
236 POWDER, FOR SOLUTION ORAL
Qty: 1 | Refills: 0 | Status: DISCONTINUED | COMMUNITY
Start: 2019-10-04 | End: 2022-07-27

## 2022-08-08 ENCOUNTER — APPOINTMENT (OUTPATIENT)
Dept: OPHTHALMOLOGY | Facility: CLINIC | Age: 72
End: 2022-08-08

## 2022-08-08 ENCOUNTER — OUTPATIENT (OUTPATIENT)
Dept: OUTPATIENT SERVICES | Facility: HOSPITAL | Age: 72
LOS: 1 days | Discharge: HOME | End: 2022-08-08

## 2022-08-08 DIAGNOSIS — Z98.890 OTHER SPECIFIED POSTPROCEDURAL STATES: Chronic | ICD-10-CM

## 2022-08-08 PROCEDURE — 92014 COMPRE OPH EXAM EST PT 1/>: CPT

## 2022-08-09 PROBLEM — I50.9 HEART FAILURE: Status: ACTIVE | Noted: 2017-11-08

## 2022-08-09 NOTE — HISTORY OF PRESENT ILLNESS
[0] : 2) Feeling down, depressed, or hopeless: Not at all (0) [PHQ-2 Negative - No further assessment needed] : PHQ-2 Negative - No further assessment needed [FreeTextEntry1] : 72 y/o M with DM, HTN, HLD, CKD, MI in 2017.\par  Has not been to [facility] since 2019 because he has been out of the country due to covid, returned last month. \par Pt seen by Dr Pardo in endo clinic last week, regimen includes Tresiba: insulin 30u in the morning and 20u at night, started on Trulicity (has not filled medication yet)\par Pt also restarted on amlodipine/ lisinopril HCTZ for elevated bp\par Pt complaining of 1 wk hx/o LT knee pain (mild-moderate, sharp, exacerbated w/ stairs, resolves w/ stretching/rest)\par Pt also complaining of 5 day hx/o LT shoulder pain radiating in LT deltoid and forearm (electric, exacerbated movements of shoulder and neck, relieved w/ rest)\par   [[id number]] : 0

## 2022-08-09 NOTE — REVIEW OF SYSTEMS
[Earache] : no earache [Loss Of Hearing] : no hearing loss [Chest Pain] : no chest pain [Palpitations] : no palpitations [Shortness Of Breath] : no shortness of breath [Cough] : no cough [Abdominal Pain] : no abdominal pain [Vomiting] : no vomiting [Dysuria] : no dysuria [Incontinence] : no incontinence [Limb Pain] : no limb pain [Limb Swelling] : no limb swelling [Skin Lesions] : no skin lesions [Skin Wound] : no skin wound [Confused] : no confusion [Convulsions] : no convulsions

## 2022-08-09 NOTE — ASSESSMENT
[FreeTextEntry1] : \par 71 year old male history of HTN, DM, HLD, CKD III, and MI in 2017 w stent placement here today for follow-up\par \par # T2DM\par - Sugars uncontrolled, last a1c 11.5\par - Started on Trulicity, currently on Tresiba\par -  F/u repeat a1c, prot/cr\par - following w/ endo\par \par # HTN\par - BP controlled today\par - Continue lisinopril/hctz\par - Continue metoprolol 25\par - Continue amlodipine 10\par - encouraged to monitor bp at home\par \par # H/o CAD/MI s/p PCI\par - Continue aspirin 81mg\par - Card f/u (Dr Shell)\par \par # H/o BPH\par - C/w Flomax\par \par # DLD, uncontrolled\par - C/w atorvastatin, restarted on zetia today\par - Repeat lipid profile\par \par # Pain of LT shoulder and arm, mild\par - Likely radiculopathy likely 2/2 muscle spasm of neck\par - Voltaren gel, warm compress, stretching, supportive measure\par - PT if refractory\par \par #LT knee pain\par - Knee brace, Voltaren gel, supportive measures\par - PT if refractory\par \par # HCM\par - GI referral for screening colono\par - Podiatry, Ophthalmology referral\par - UTD w/ covid, pneumonia, and shingles vaccine as per pt\par - RTC 3 months, repeat lab work ordered

## 2022-08-09 NOTE — PHYSICAL EXAM
[Alert] : alert [No Acute Distress] : in no acute distress [No Respiratory Distress] : no respiratory distress [Heart Rate And Rhythm] : heart rate was normal and rhythm regular [Normal Appearance] : the appearance of the neck was normal [Auscultation Breath Sounds / Voice Sounds] : lungs were clear to auscultation bilaterally [Normal S1, S2] : normal S1 and S2 [Heart Sounds Gallop] : no gallops [Bowel Sounds] : normal bowel sounds [Abdomen Tenderness] : non-tender [Abdomen Soft] : soft [No Spinal Tenderness] : no spinal tenderness [de-identified] : +systolic murmur [de-identified] : +rales in B/L bases

## 2022-08-10 DIAGNOSIS — H04.129 DRY EYE SYNDROME OF UNSPECIFIED LACRIMAL GLAND: ICD-10-CM

## 2022-08-10 DIAGNOSIS — H43.393 OTHER VITREOUS OPACITIES, BILATERAL: ICD-10-CM

## 2022-08-10 DIAGNOSIS — H25.013 CORTICAL AGE-RELATED CATARACT, BILATERAL: ICD-10-CM

## 2022-08-10 DIAGNOSIS — H43.829 VITREOMACULAR ADHESION, UNSPECIFIED EYE: ICD-10-CM

## 2022-08-10 DIAGNOSIS — H35.039 HYPERTENSIVE RETINOPATHY, UNSPECIFIED EYE: ICD-10-CM

## 2022-08-10 DIAGNOSIS — H25.10 AGE-RELATED NUCLEAR CATARACT, UNSPECIFIED EYE: ICD-10-CM

## 2022-08-11 ENCOUNTER — APPOINTMENT (OUTPATIENT)
Dept: NUTRITION | Facility: CLINIC | Age: 72
End: 2022-08-11

## 2022-08-23 ENCOUNTER — OUTPATIENT (OUTPATIENT)
Dept: OUTPATIENT SERVICES | Facility: HOSPITAL | Age: 72
LOS: 1 days | Discharge: HOME | End: 2022-08-23

## 2022-08-23 ENCOUNTER — APPOINTMENT (OUTPATIENT)
Dept: OPHTHALMOLOGY | Facility: CLINIC | Age: 72
End: 2022-08-23

## 2022-08-23 DIAGNOSIS — H43.393 OTHER VITREOUS OPACITIES, BILATERAL: ICD-10-CM

## 2022-08-23 DIAGNOSIS — H43.829 VITREOMACULAR ADHESION, UNSPECIFIED EYE: ICD-10-CM

## 2022-08-23 DIAGNOSIS — H35.039 HYPERTENSIVE RETINOPATHY, UNSPECIFIED EYE: ICD-10-CM

## 2022-08-23 DIAGNOSIS — H04.129 DRY EYE SYNDROME OF UNSPECIFIED LACRIMAL GLAND: ICD-10-CM

## 2022-08-23 DIAGNOSIS — Z98.890 OTHER SPECIFIED POSTPROCEDURAL STATES: Chronic | ICD-10-CM

## 2022-08-23 DIAGNOSIS — H25.10 AGE-RELATED NUCLEAR CATARACT, UNSPECIFIED EYE: ICD-10-CM

## 2022-08-23 PROCEDURE — 92014 COMPRE OPH EXAM EST PT 1/>: CPT

## 2022-09-15 ENCOUNTER — APPOINTMENT (OUTPATIENT)
Dept: ENDOCRINOLOGY | Facility: CLINIC | Age: 72
End: 2022-09-15

## 2022-09-15 ENCOUNTER — OUTPATIENT (OUTPATIENT)
Dept: OUTPATIENT SERVICES | Facility: HOSPITAL | Age: 72
LOS: 1 days | Discharge: HOME | End: 2022-09-15

## 2022-09-15 VITALS
SYSTOLIC BLOOD PRESSURE: 165 MMHG | BODY MASS INDEX: 26.81 KG/M2 | HEART RATE: 65 BPM | DIASTOLIC BLOOD PRESSURE: 101 MMHG | HEIGHT: 69 IN | WEIGHT: 181 LBS

## 2022-09-15 DIAGNOSIS — Z98.890 OTHER SPECIFIED POSTPROCEDURAL STATES: Chronic | ICD-10-CM

## 2022-09-15 NOTE — REVIEW OF SYSTEMS
[Negative] : ENT [Fatigue] : no fatigue [Chest Pain] : no chest pain [Palpitations] : no palpitations [Fast Heart Rate] : heart rate is not fast [Shortness Of Breath] : no shortness of breath [Cough] : no cough [Nausea] : no nausea [Vomiting] : no vomiting

## 2022-09-15 NOTE — ASSESSMENT
[FreeTextEntry1] : Pt is a 70 y/o M PMHx of CAD, HTN, HLD, CKD here for DM follow up. Also found to have elevated BP and \par \par #DM\par -c/w insulin 30u in morning and 20u at night\par -C/w trulicity\par -A1C improving 07/22 11.5 --> 10.2 in 09/22\par - Remains uncontrolled, patient reports FS at home at 200s\par \par #HTN, uncontrolled\par -165/101\par -taking amlodipine and lisinpril-HCTZ, pt reports he is complaint, but he ran out of pills 1 week ago\par \par HLD\par -TAG elevated to 199, otherwise WNL\par - C/w Ezetimibe\par - C/w current regimen\par \par CAD\par - On aspirin/plavix c/w current regimen [Diabetes Foot Care] : diabetes foot care [Long Term Vascular Complications] : long term vascular complications of diabetes [Carbohydrate Consistent Diet] : carbohydrate consistent diet [Importance of Diet and Exercise] : importance of diet and exercise to improve glycemic control, achieve weight loss and improve cardiovascular health [Exercise/Effect on Glucose] : exercise/effect on glucose [Hypoglycemia Management] : hypoglycemia management [Retinopathy Screening] : Patient was referred to ophthalmology for retinopathy screening

## 2022-09-15 NOTE — HISTORY OF PRESENT ILLNESS
[FreeTextEntry1] : Pt is a 72 y/o M with DM, HTN, HLD, CKD, MI in 2017.\par  Pt reports for a follow up. Previously he was lost to follow up since 2019 because he has been out of the country due to covid, returned in July. Pt states he has been compliant with his medications since previous visit, but he ran out of his weekly medications month ago.\par \par His FS are usually around 200s.

## 2022-09-15 NOTE — PHYSICAL EXAM
[Alert] : alert [Well Nourished] : well nourished [No Neck Mass] : no neck mass was observed [No Respiratory Distress] : no respiratory distress [Normal S1, S2] : normal S1 and S2

## 2022-09-20 LAB
ALBUMIN SERPL ELPH-MCNC: 4.1 G/DL
ALP BLD-CCNC: 89 U/L
ALT SERPL-CCNC: 22 U/L
ANION GAP SERPL CALC-SCNC: 15 MMOL/L
AST SERPL-CCNC: 23 U/L
BASOPHILS # BLD AUTO: 0.05 K/UL
BASOPHILS NFR BLD AUTO: 0.7 %
BILIRUB SERPL-MCNC: 0.2 MG/DL
BUN SERPL-MCNC: 43 MG/DL
CALCIUM SERPL-MCNC: 9.4 MG/DL
CHLORIDE SERPL-SCNC: 103 MMOL/L
CHOLEST SERPL-MCNC: 137 MG/DL
CO2 SERPL-SCNC: 22 MMOL/L
CREAT SERPL-MCNC: 2.22 MG/DL
CREAT SPEC-SCNC: 83 MG/DL
CREAT/PROT UR: 0.3 RATIO
EGFR: 31 ML/MIN/1.73M2
EOSINOPHIL # BLD AUTO: 0.41 K/UL
EOSINOPHIL NFR BLD AUTO: 5.6 %
ESTIMATED AVERAGE GLUCOSE: 246 MG/DL
GLUCOSE SERPL-MCNC: 136 MG/DL
HBA1C MFR BLD HPLC: 10.2 %
HCT VFR BLD CALC: 42.7 %
HDLC SERPL-MCNC: 49 MG/DL
HGB BLD-MCNC: 13 G/DL
IMM GRANULOCYTES NFR BLD AUTO: 0.1 %
LDLC SERPL CALC-MCNC: 48 MG/DL
LYMPHOCYTES # BLD AUTO: 3.13 K/UL
LYMPHOCYTES NFR BLD AUTO: 42.6 %
MAN DIFF?: NORMAL
MCHC RBC-ENTMCNC: 26.8 PG
MCHC RBC-ENTMCNC: 30.4 GM/DL
MCV RBC AUTO: 88 FL
MONOCYTES # BLD AUTO: 0.57 K/UL
MONOCYTES NFR BLD AUTO: 7.8 %
NEUTROPHILS # BLD AUTO: 3.17 K/UL
NEUTROPHILS NFR BLD AUTO: 43.2 %
NONHDLC SERPL-MCNC: 88 MG/DL
PLATELET # BLD AUTO: 357 K/UL
POTASSIUM SERPL-SCNC: 4.7 MMOL/L
PROT SERPL-MCNC: 7.5 G/DL
PROT UR-MCNC: 28 MG/DL
RBC # BLD: 4.85 M/UL
RBC # FLD: 15.9 %
SODIUM SERPL-SCNC: 140 MMOL/L
TRIGL SERPL-MCNC: 199 MG/DL
TSH SERPL-ACNC: 3.13 UIU/ML
WBC # FLD AUTO: 7.34 K/UL

## 2022-09-28 ENCOUNTER — APPOINTMENT (OUTPATIENT)
Dept: OPHTHALMOLOGY | Facility: CLINIC | Age: 72
End: 2022-09-28

## 2022-09-28 ENCOUNTER — OUTPATIENT (OUTPATIENT)
Dept: OUTPATIENT SERVICES | Facility: HOSPITAL | Age: 72
LOS: 1 days | Discharge: HOME | End: 2022-09-28

## 2022-09-28 DIAGNOSIS — Z98.890 OTHER SPECIFIED POSTPROCEDURAL STATES: Chronic | ICD-10-CM

## 2022-09-28 PROCEDURE — 92136 OPHTHALMIC BIOMETRY: CPT | Mod: 26

## 2022-10-26 ENCOUNTER — OUTPATIENT (OUTPATIENT)
Dept: OUTPATIENT SERVICES | Facility: HOSPITAL | Age: 72
LOS: 1 days | Discharge: HOME | End: 2022-10-26

## 2022-10-26 ENCOUNTER — APPOINTMENT (OUTPATIENT)
Dept: GERIATRICS | Facility: CLINIC | Age: 72
End: 2022-10-26

## 2022-10-26 VITALS
HEIGHT: 69 IN | BODY MASS INDEX: 26.81 KG/M2 | DIASTOLIC BLOOD PRESSURE: 83 MMHG | TEMPERATURE: 97.9 F | SYSTOLIC BLOOD PRESSURE: 157 MMHG | HEART RATE: 93 BPM | WEIGHT: 181 LBS | OXYGEN SATURATION: 99 %

## 2022-10-26 DIAGNOSIS — I10 ESSENTIAL (PRIMARY) HYPERTENSION: ICD-10-CM

## 2022-10-26 DIAGNOSIS — Z98.890 OTHER SPECIFIED POSTPROCEDURAL STATES: Chronic | ICD-10-CM

## 2022-10-26 DIAGNOSIS — N18.30 CHRONIC KIDNEY DISEASE, STAGE 3 UNSPECIFIED: ICD-10-CM

## 2022-10-26 DIAGNOSIS — Z00.00 ENCOUNTER FOR GENERAL ADULT MEDICAL EXAMINATION WITHOUT ABNORMAL FINDINGS: ICD-10-CM

## 2022-10-26 DIAGNOSIS — E78.5 HYPERLIPIDEMIA, UNSPECIFIED: ICD-10-CM

## 2022-10-26 DIAGNOSIS — K63.5 POLYP OF COLON: ICD-10-CM

## 2022-10-26 DIAGNOSIS — I25.10 ATHEROSCLEROTIC HEART DISEASE OF NATIVE CORONARY ARTERY WITHOUT ANGINA PECTORIS: ICD-10-CM

## 2022-10-26 DIAGNOSIS — K21.9 GASTRO-ESOPHAGEAL REFLUX DISEASE W/OUT ESOPHAGITIS: ICD-10-CM

## 2022-10-26 DIAGNOSIS — N40.0 BENIGN PROSTATIC HYPERPLASIA WITHOUT LOWER URINARY TRACT SYMPMS: ICD-10-CM

## 2022-10-26 DIAGNOSIS — E11.9 TYPE 2 DIABETES MELLITUS WITHOUT COMPLICATIONS: ICD-10-CM

## 2022-10-26 DIAGNOSIS — N40.0 BENIGN PROSTATIC HYPERPLASIA WITHOUT LOWER URINARY TRACT SYMPTOMS: ICD-10-CM

## 2022-10-26 PROCEDURE — 99214 OFFICE O/P EST MOD 30 MIN: CPT | Mod: GC

## 2022-10-26 RX ORDER — PEN NEEDLE, DIABETIC 29 G X1/2"
31G X 5 MM NEEDLE, DISPOSABLE MISCELLANEOUS
Qty: 100 | Refills: 2 | Status: ACTIVE | COMMUNITY
Start: 2019-11-11

## 2022-10-26 RX ORDER — DICLOFENAC SODIUM 1% 10 MG/G
1 GEL TOPICAL DAILY
Qty: 1 | Refills: 0 | Status: DISCONTINUED | COMMUNITY
Start: 2022-07-27 | End: 2022-10-26

## 2022-10-26 RX ORDER — LISINOPRIL AND HYDROCHLOROTHIAZIDE TABLETS 10; 12.5 MG/1; MG/1
10-12.5 TABLET ORAL DAILY
Qty: 90 | Refills: 3 | Status: DISCONTINUED | COMMUNITY
Start: 2018-10-19 | End: 2022-10-26

## 2022-10-26 NOTE — PHYSICAL EXAM
[Alert] : alert [Normal Appearance] : the appearance of the neck was normal [No Respiratory Distress] : no respiratory distress [Auscultation Breath Sounds / Voice Sounds] : lungs were clear to auscultation bilaterally [Normal S1, S2] : normal S1 and S2 [Heart Rate And Rhythm] : heart rate was normal and rhythm regular [Heart Sounds Gallop] : no gallops [Bowel Sounds] : normal bowel sounds [Abdomen Tenderness] : non-tender [Abdomen Soft] : soft [No Spinal Tenderness] : no spinal tenderness [de-identified] : +systolic murmur

## 2022-10-26 NOTE — ASSESSMENT
[FreeTextEntry1] : 73 y/o M with DM, HTN, HLD, CKD, MI in 2017 here for follow up visit and for preop clearance for cataract removal of left eye on 12/20/22. No other complaints. \par \par \par #Preop clearance for cataract surgery\par - Patient scheduled for left cataract removal on 12/20/2022\par - Patient is medically optimized for a low risk procedure under topical anesthesia\par \par #Uncontrolled T2DM\par -A1C improving 07/22 11.5 --> 10.2 in 09/22\par -c/w insulin 30u in morning and 20u at night\par -C/w trulicity\par - Patient may benefit from SGLPT2 inhibitor (f/u with endo)\par - following w/ endo\par \par # HTN\par - Continue lisinopril\par - discontinued lisinopril\par - Continue metoprolol 25\par - Continue amlodipine 10\par - encouraged to monitor bp at home and call us back in 1 month with readings for BS and BP \par \par #CKD III\par - creat 2.2, GFR 31\par - Nephrology referral given \par \par # H/o CAD/MI s/p PCI\par - Continue aspirin 81mg\par - Card f/u (Dr Shell)\par \par # H/o BPH\par - C/w Flomax\par \par # DLD, uncontrolled\par - C/w atorvastatin and zetia today\par - Repeat lipid profile\par \par # Pain of LT shoulder and arm, mild (resolved)\par - pt prn\par \par #LT knee pain (resolved)\par - PT prn \par \par # HCM\par - GI referral given and reports has appt this friday 10/28\par - Flu vaccine administered 10/26/200\par - RTC 1-3 months, repeat lab work ordered

## 2022-10-26 NOTE — HISTORY OF PRESENT ILLNESS
[0] : 2) Feeling down, depressed, or hopeless: Not at all (0) [PHQ-2 Negative - No further assessment needed] : PHQ-2 Negative - No further assessment needed [FreeTextEntry1] : 71 y/o M with DM, HTN, HLD, CKD, MI in 2017. Patient reports of fatigues since past 2 weeks. Patient denies sob, chest pain, dizziness, bowel changes, depression. \par \par Patient here with cataract surgery form to be signed by MD; planned for cataract removal of left eye on 12/20/200. No other complaints. \par Patient reports to be complaint with all medications \par \par Patient say Dr. Mullen 9/2022 and recommended to continue with current regimen; medications were refilled for 6 months.  [[id number]] : 0

## 2022-10-28 ENCOUNTER — APPOINTMENT (OUTPATIENT)
Dept: GASTROENTEROLOGY | Facility: CLINIC | Age: 72
End: 2022-10-28

## 2022-11-19 ENCOUNTER — LABORATORY RESULT (OUTPATIENT)
Age: 72
End: 2022-11-19

## 2022-11-28 LAB
ALBUMIN SERPL ELPH-MCNC: 4.4 G/DL
ALP BLD-CCNC: 91 U/L
ALT SERPL-CCNC: 17 U/L
ANION GAP SERPL CALC-SCNC: 13 MMOL/L
APPEARANCE: CLEAR
AST SERPL-CCNC: 18 U/L
BILIRUB SERPL-MCNC: 0.2 MG/DL
BILIRUBIN URINE: NEGATIVE
BLOOD URINE: NEGATIVE
BUN SERPL-MCNC: 22 MG/DL
CALCIUM SERPL-MCNC: 10.1 MG/DL
CHLORIDE SERPL-SCNC: 102 MMOL/L
CHOLEST SERPL-MCNC: 263 MG/DL
CO2 SERPL-SCNC: 23 MMOL/L
COLOR: YELLOW
CREAT SERPL-MCNC: 1.76 MG/DL
EGFR: 41 ML/MIN/1.73M2
ESTIMATED AVERAGE GLUCOSE: 203 MG/DL
GLUCOSE QUALITATIVE U: NEGATIVE
GLUCOSE SERPL-MCNC: 96 MG/DL
HBA1C MFR BLD HPLC: 8.7 %
HDLC SERPL-MCNC: 48 MG/DL
KETONES URINE: NEGATIVE
LDLC SERPL CALC-MCNC: 179 MG/DL
LEUKOCYTE ESTERASE URINE: NEGATIVE
NITRITE URINE: NEGATIVE
NONHDLC SERPL-MCNC: 215 MG/DL
PH URINE: 6
POTASSIUM SERPL-SCNC: 4.6 MMOL/L
PROT SERPL-MCNC: 8.3 G/DL
PROTEIN URINE: ABNORMAL
SODIUM SERPL-SCNC: 138 MMOL/L
SPECIFIC GRAVITY URINE: 1.02
TRIGL SERPL-MCNC: 181 MG/DL
TSH SERPL-ACNC: 2.47 UIU/ML
UROBILINOGEN URINE: NORMAL

## 2022-12-08 ENCOUNTER — APPOINTMENT (OUTPATIENT)
Dept: GERIATRICS | Facility: CLINIC | Age: 72
End: 2022-12-08

## 2022-12-08 DIAGNOSIS — H25.89 OTHER AGE-RELATED CATARACT: ICD-10-CM

## 2022-12-08 NOTE — ASSESSMENT
[FreeTextEntry1] : See attached forms for cataract removal of left eye on 12/20/22; is hemodynamically stable for the procedure\par Re\par CAD/DM/HTN etc to continue all maintenance medications

## 2022-12-08 NOTE — HISTORY OF PRESENT ILLNESS
[FreeTextEntry1] : here for perioperative assessment for cataract removal of left eye\par Complinat with meds

## 2022-12-20 ENCOUNTER — OUTPATIENT (OUTPATIENT)
Dept: OUTPATIENT SERVICES | Facility: HOSPITAL | Age: 72
LOS: 1 days | Discharge: HOME | End: 2022-12-20

## 2022-12-20 VITALS
OXYGEN SATURATION: 100 % | SYSTOLIC BLOOD PRESSURE: 145 MMHG | RESPIRATION RATE: 17 BRPM | TEMPERATURE: 98 F | DIASTOLIC BLOOD PRESSURE: 79 MMHG | HEART RATE: 94 BPM | WEIGHT: 210.1 LBS | HEIGHT: 65 IN

## 2022-12-20 VITALS — HEART RATE: 83 BPM | SYSTOLIC BLOOD PRESSURE: 127 MMHG | DIASTOLIC BLOOD PRESSURE: 87 MMHG | RESPIRATION RATE: 17 BRPM

## 2022-12-20 DIAGNOSIS — Z98.890 OTHER SPECIFIED POSTPROCEDURAL STATES: Chronic | ICD-10-CM

## 2022-12-20 LAB — GLUCOSE BLDC GLUCOMTR-MCNC: 148 MG/DL — HIGH (ref 70–99)

## 2022-12-20 NOTE — ASU PATIENT PROFILE, ADULT - FALL HARM RISK - HARM RISK INTERVENTIONS

## 2022-12-20 NOTE — ASU PREOP CHECKLIST - NS PREOP CHK MONITOR ANESTHESIA CONSENT
"  Emergency Department    [address] [facility] [address]    Phone:  [phone]    Fax:  [phone]                                       Juany Blandon   MRN: [id number]    Department:   Emergency Department   Date of Visit:  7/7/2017           Patient Information     Date Of Birth          1975        Your diagnoses for this visit were:     Epistaxis     Atypical chest pain        You were seen by Zaida Wright PA-C.      Follow-up Information     Follow up with Korina Garcia MD.    Specialty:  Family Practice    Contact information:    [facility]  [address]  [place] [address]  [phone]          Follow up with  Emergency Department.    Specialty:  EMERGENCY MEDICINE    Why:  If symptoms worsen    Contact information:    [address] [facility] [id number] [phone]        Discharge Instructions         Discharge Instructions  Epistaxis    Today you were seen for a nosebleed.   Nosebleeds (the medical term is \"epistaxis\") are very common. Almost every person has had at least one in their lifetime.  Although the amount of blood loss can appear dramatic, nosebleeds rarely cause serious problems.  The most common causes are dry air or nose picking, but they also are common in people who have allergies, high blood pressure or are on blood thinners, such as Coumadin, Aspirin or Plavix. If you or your child gets a nosebleed, the important thing is to know how to take care of it. With the right self-care, most nosebleeds will stop on their own.    Return to the Emergency Department if:    Your nose is bleeding a very large amount of blood.    You get very pale, faint, or tired.    You cannot get the bleeding to stop after following these instructions.    A nosebleed happens after recent nasal surgery or if you have a known nasal tumor.    You have new, serious symptoms, such as chest pain.    You get a nosebleed after an " injury, such as being hit in the face, and you are concerned that you could have other injuries (like a broken bone).    Treatment:    Your doctor may tell you to use a decongestant nose spray, like Afrin  (oxymetazoline), in both nostrils in the morning and at night for the next three days. Do not use this medicine for more than three days at a time.  If you do it will cause nasal congestion.     You should apply a small amount of Vaseline  to the inside of your nostrils for moisture before bed.  Using a humidifier in your bedroom will help as well.    For the next three days, do not blow your nose or put anything in your nose. You may sniffle, or dab the outside of your nose.    Do not bend with your head below your waist for the next three days. Do not lift anything so heavy that you have to strain.     If you received nasal packing, please do not remove the packing until seen by an Ear Nose and Throat specialist.  If antibiotics have been given with the packing please take as directed.    If your nose starts to bleed again:    Blow your nose to get rid of some of the clots that have formed inside your nostrils. This may increase the bleeding temporarily, but that's OK.    Spray decongestant nose spray (like Afrin ) into both nostrils to constrict the blood vessels.    Sit or stand while bending forward slightly at the waist. Do not lie down or tilt your head back. This may cause you to swallow blood and can lead to vomiting.     the soft part of BOTH nostrils at the bottom of your nose and squeeze your nose closed for at least 5 minutes (for children) or 10 to 15 minutes (for adults). You can use your fingers, or the clip we gave you here. Use a clock to time yourself. Do not release the pressure every so often to check whether the bleeding has stopped. Many people hurt their chances of stopping the bleeding by releasing the pressure too soon or too often.    If you follow the steps outlined above, and your  nose continues to bleed, repeat all the steps once more. Apply pressure for a total of at least 30 minutes. If you continue to bleed even then, return to the Emergency Department or go to an urgent care clinic.    If you keep having nose bleeds, schedule an appointment with your regular doctor, or with an Ear, Nose, and Throat Specialist.  If you were given a prescription for medicine here today, be sure to read all of the information (including the package insert) that comes with your prescription.  This will include important information about the medicine, its side effects, and any warnings that you need to know about.  The pharmacist who fills the prescription can provide more information and answer questions you may have about the medicine.  If you have questions or concerns that the pharmacist cannot address, please call or return to the Emergency Department.   Discharge Instructions  Chest Pain    You have been seen today for chest pain or discomfort.  At this time, your doctor has found no signs that your chest pain is due to a serious or life-threatening condition, (or you have declined more testing and/or admission to the hospital). However, sometimes there is a serious problem that does not show up right away. Your evaluation today may not be complete and you may need further testing and evaluation.     You need to follow-up with your regular doctor within 3 days.    Return to the Emergency Department if:    Your chest pain changes, gets worse, starts to happen more often, or comes with less activity.    You are short of breath.    You get very weak or tired.    You pass out or faint.    You have any new symptoms, like fever, cough, numb legs, or you cough up blood.    You have anything else that worries you.    Until you follow-up with your regular doctor please do the following:    Take one aspirin daily unless you have an allergy or are told not to by your doctor.    If a stress test appointment has  been made, go to the appointment.    If you have questions, contact your regular doctor.    If your doctor today has told you to follow-up with your regular doctor, it is very important that you make an appointment with your clinic and go to the appointment.  If you do not follow-up with your primary doctor, it may result in missing an important development which could result in permanent injury or disability and/or lasting pain.  If there is any problem keeping your appointment, call your doctor or return to the Emergency Department.    If you were given a prescription for medicine here today, be sure to read all of the information (including the package insert) that comes with your prescription.  This will include important information about the medicine, its side effects, and any warnings that you need to know about.  The pharmacist who fills the prescription can provide more information and answer questions you may have about the medicine.  If you have questions or concerns that the pharmacist cannot address, please call or return to the Emergency Department.         Remember that you can always come back to the Emergency Department if you are not able to see your regular doctor in the amount of time listed above, if you get any new symptoms, or if there is anything that worries you.              Future Appointments        Provider Department Dept Phone Center    7/12/2017 9:30 AM Korina Garcia MD [facility] [phone]       24 Hour Appointment Hotline       To make an appointment at any [facility], call [phone] ([phone]). If you don't have a family doctor or clinic, we will help you find one. [facility] are conveniently located to serve the needs of you and your family.          ED Discharge Orders     Exercise Stress Echocardiogram       The supervising Cardiologist may change the type of echocardiogram requested to answer a specific clinical question  based on existing protocols in the Echocardiography laboratory.    Use of contrast is at the discretion of the supervising Cardiologist.                     Review of your medicines      Our records show that you are taking the medicines listed below. If these are incorrect, please call your family doctor or clinic.        Dose / Directions Last dose taken    losartan 25 MG tablet   Commonly known as:  COZAAR   Dose:  25 mg   Quantity:  90 tablet        Take 1 tablet (25 mg) by mouth daily   Refills:  1                Procedures and tests performed during your visit     Basic metabolic panel    CBC with platelets differential    EKG 12-lead, tracing only    Troponin I    XR Chest 2 Views      Orders Needing Specimen Collection     None      Pending Results     Date and Time Order Name Status Description    7/7/2017 2140 EKG 12-lead, tracing only Preliminary             Pending Culture Results     No orders found from 7/5/2017 to 7/8/2017.            Pending Results Instructions     If you had any lab results that were not finalized at the time of your Discharge, you can call the ED Lab Result RN at [phone]. You will be contacted by this team for any positive Lab results or changes in treatment. The nurses are available 7 days a week from 10A to 6:30P.  You can leave a message 24 hours per day and they will return your call.        Test Results From Your Hospital Stay        7/7/2017 10:15 PM      Component Results     Component Value Ref Range & Units Status    WBC 7.7 4.0 - 11.0 10e9/L Final    RBC Count 4.20 3.8 - 5.2 10e12/L Final    Hemoglobin 13.2 11.7 - 15.7 g/dL Final    Hematocrit 37.2 35.0 - 47.0 % Final    MCV 89 78 - 100 fl Final    MCH 31.4 26.5 - 33.0 pg Final    MCHC 35.5 31.5 - 36.5 g/dL Final    RDW 12.4 10.0 - 15.0 % Final    Platelet Count 272 150 - 450 10e9/L Final    Diff Method Automated Method  Final    % Neutrophils 52.5 % Final    % Lymphocytes 39.6 % Final    % Monocytes 5.8 % Final     % Eosinophils 1.7 % Final    % Basophils 0.3 % Final    % Immature Granulocytes 0.1 % Final    Nucleated RBCs 0 0 /100 Final    Absolute Neutrophil 4.0 1.6 - 8.3 10e9/L Final    Absolute Lymphocytes 3.0 0.8 - 5.3 10e9/L Final    Absolute Monocytes 0.4 0.0 - 1.3 10e9/L Final    Absolute Eosinophils 0.1 0.0 - 0.7 10e9/L Final    Absolute Basophils 0.0 0.0 - 0.2 10e9/L Final    Abs Immature Granulocytes 0.0 0 - 0.4 10e9/L Final    Absolute Nucleated RBC 0.0  Final         7/7/2017 10:16 PM      Component Results     Component Value Ref Range & Units Status    Sodium 140 133 - 144 mmol/L Final    Potassium 4.0 3.4 - 5.3 mmol/L Final    Chloride 104 94 - 109 mmol/L Final    Carbon Dioxide 30 20 - 32 mmol/L Final    Anion Gap 6 3 - 14 mmol/L Final    Glucose 105 (H) 70 - 99 mg/dL Final    Urea Nitrogen 18 7 - 30 mg/dL Final    Creatinine 0.75 0.52 - 1.04 mg/dL Final    GFR Estimate 85 >60 mL/min/1.7m2 Final    Non  GFR Calc    GFR Estimate If Black >90   GFR Calc   >60 mL/min/1.7m2 Final    Calcium 8.1 (L) 8.5 - 10.1 mg/dL Final         7/7/2017 10:18 PM      Component Results     Component Value Ref Range & Units Status    Troponin I ES  0.000 - 0.045 ug/L Final    <0.015  The 99th percentile for upper reference range is 0.045 ug/L.  Troponin values in   the range of 0.045 - 0.120 ug/L may be associated with risks of adverse   clinical events.           7/7/2017 10:17 PM      Narrative     XR CHEST 2 VW   7/7/2017 9:59 PM     HISTORY: chest pain    COMPARISON: None.    FINDINGS: The heart is negative.  The lungs are clear. The pulmonary  vasculature is normal.  The bones and soft tissues are unremarkable.        Impression     IMPRESSION: No active infiltrate.        JUANY COPELAND MD                Clinical Quality Measure: Blood Pressure Screening     Your blood pressure was checked while you were in the emergency department today. The last reading we obtained was  BP: 138/90 . Please read  the guidelines below about what these numbers mean and what you should do about them.  If your systolic blood pressure (the top number) is less than 120 and your diastolic blood pressure (the bottom number) is less than 80, then your blood pressure is normal. There is nothing more that you need to do about it.  If your systolic blood pressure (the top number) is 120-139 or your diastolic blood pressure (the bottom number) is 80-89, your blood pressure may be higher than it should be. You should have your blood pressure rechecked within a year by a primary care provider.  If your systolic blood pressure (the top number) is 140 or greater or your diastolic blood pressure (the bottom number) is 90 or greater, you may have high blood pressure. High blood pressure is treatable, but if left untreated over time it can put you at risk for heart attack, stroke, or kidney failure. You should have your blood pressure rechecked by a primary care provider within the next 4 weeks.  If your provider in the emergency department today gave you specific instructions to follow-up with your doctor or provider even sooner than that, you should follow that instruction and not wait for up to 4 weeks for your follow-up visit.        Thank you for choosing [facility]       Thank you for choosing [facility] for your care. Our goal is always to provide you with excellent care. Hearing back from our patients is one way we can continue to improve our services. Please take a few minutes to complete the written survey that you may receive in the mail after you visit with us. Thank you!        [facility] Information     [facility] gives you secure access to your electronic health record. If you see a primary care provider, you can also send messages to your care team and make appointments. If you have questions, please call your primary care clinic.  If you do not have a primary care provider, please call [phone] and they will assist you.        Care  EveryWhere ID     This is your Care EveryWhere ID. This could be used by other organizations to access your [facility] medical records  [id number]        Equal Access to Services     MELISSA COURTNEY : Ralph Dill, pancho walden, kateryna coker, nettie gee. So [facility] [phone].    ATENCIÓN: Si habla español, tiene a jacques disposición servicios gratuitos de asistencia lingüística. Llame al [phone].    We comply with applicable federal civil rights laws and [place] laws. We do not discriminate on the basis of race, color, national origin, age, disability sex, sexual orientation or gender identity.            After Visit Summary       This is your record. Keep this with you and show to your community pharmacist(s) and doctor(s) at your next visit.                   done

## 2022-12-20 NOTE — PACU DISCHARGE NOTE - COMMENTS
72 y o male S/P Left ECCE with IOL Implant, LSB/MAC without complications. VS /72 HR 77 RR 16 SaO2 99%. Pt tolerated procedure well.

## 2022-12-20 NOTE — ASU PATIENT PROFILE, ADULT - NSICDXPASTSURGICALHX_GEN_ALL_CORE_FT
PAST SURGICAL HISTORY:  H/O foot surgery SCREWS IMPLANTED    History of cardiac cath STENT PLACEMENT X 1

## 2022-12-21 ENCOUNTER — OUTPATIENT (OUTPATIENT)
Dept: OUTPATIENT SERVICES | Facility: HOSPITAL | Age: 72
LOS: 1 days | Discharge: HOME | End: 2022-12-21

## 2022-12-21 ENCOUNTER — APPOINTMENT (OUTPATIENT)
Dept: OPHTHALMOLOGY | Facility: CLINIC | Age: 72
End: 2022-12-21

## 2022-12-21 DIAGNOSIS — Z98.890 OTHER SPECIFIED POSTPROCEDURAL STATES: Chronic | ICD-10-CM

## 2022-12-21 PROCEDURE — 99024 POSTOP FOLLOW-UP VISIT: CPT

## 2022-12-27 DIAGNOSIS — E78.5 HYPERLIPIDEMIA, UNSPECIFIED: ICD-10-CM

## 2022-12-27 DIAGNOSIS — K21.9 GASTRO-ESOPHAGEAL REFLUX DISEASE WITHOUT ESOPHAGITIS: ICD-10-CM

## 2022-12-27 DIAGNOSIS — Z79.82 LONG TERM (CURRENT) USE OF ASPIRIN: ICD-10-CM

## 2022-12-27 DIAGNOSIS — H25.12 AGE-RELATED NUCLEAR CATARACT, LEFT EYE: ICD-10-CM

## 2022-12-27 DIAGNOSIS — I50.9 HEART FAILURE, UNSPECIFIED: ICD-10-CM

## 2022-12-27 DIAGNOSIS — N18.30 CHRONIC KIDNEY DISEASE, STAGE 3 UNSPECIFIED: ICD-10-CM

## 2022-12-27 DIAGNOSIS — I13.0 HYPERTENSIVE HEART AND CHRONIC KIDNEY DISEASE WITH HEART FAILURE AND STAGE 1 THROUGH STAGE 4 CHRONIC KIDNEY DISEASE, OR UNSPECIFIED CHRONIC KIDNEY DISEASE: ICD-10-CM

## 2022-12-27 DIAGNOSIS — M10.9 GOUT, UNSPECIFIED: ICD-10-CM

## 2022-12-27 DIAGNOSIS — E11.22 TYPE 2 DIABETES MELLITUS WITH DIABETIC CHRONIC KIDNEY DISEASE: ICD-10-CM

## 2022-12-27 DIAGNOSIS — Z79.4 LONG TERM (CURRENT) USE OF INSULIN: ICD-10-CM

## 2022-12-27 DIAGNOSIS — I25.10 ATHEROSCLEROTIC HEART DISEASE OF NATIVE CORONARY ARTERY WITHOUT ANGINA PECTORIS: ICD-10-CM

## 2022-12-28 ENCOUNTER — OUTPATIENT (OUTPATIENT)
Dept: OUTPATIENT SERVICES | Facility: HOSPITAL | Age: 72
LOS: 1 days | Discharge: HOME | End: 2022-12-28

## 2022-12-28 ENCOUNTER — APPOINTMENT (OUTPATIENT)
Dept: OPHTHALMOLOGY | Facility: CLINIC | Age: 72
End: 2022-12-28

## 2022-12-28 DIAGNOSIS — Z98.890 OTHER SPECIFIED POSTPROCEDURAL STATES: Chronic | ICD-10-CM

## 2022-12-28 PROCEDURE — 99024 POSTOP FOLLOW-UP VISIT: CPT

## 2022-12-29 DIAGNOSIS — H43.393 OTHER VITREOUS OPACITIES, BILATERAL: ICD-10-CM

## 2022-12-29 DIAGNOSIS — H35.039 HYPERTENSIVE RETINOPATHY, UNSPECIFIED EYE: ICD-10-CM

## 2022-12-29 DIAGNOSIS — H25.10 AGE-RELATED NUCLEAR CATARACT, UNSPECIFIED EYE: ICD-10-CM

## 2022-12-29 DIAGNOSIS — H43.829 VITREOMACULAR ADHESION, UNSPECIFIED EYE: ICD-10-CM

## 2022-12-29 DIAGNOSIS — H04.129 DRY EYE SYNDROME OF UNSPECIFIED LACRIMAL GLAND: ICD-10-CM

## 2023-01-04 ENCOUNTER — NON-APPOINTMENT (OUTPATIENT)
Age: 73
End: 2023-01-04

## 2023-01-04 ENCOUNTER — OUTPATIENT (OUTPATIENT)
Dept: OUTPATIENT SERVICES | Facility: HOSPITAL | Age: 73
LOS: 1 days | Discharge: HOME | End: 2023-01-04

## 2023-01-04 ENCOUNTER — APPOINTMENT (OUTPATIENT)
Dept: GERIATRICS | Facility: CLINIC | Age: 73
End: 2023-01-04
Payer: MEDICAID

## 2023-01-04 VITALS
HEIGHT: 69 IN | HEART RATE: 94 BPM | TEMPERATURE: 97 F | OXYGEN SATURATION: 99 % | BODY MASS INDEX: 26.66 KG/M2 | WEIGHT: 180 LBS

## 2023-01-04 VITALS — DIASTOLIC BLOOD PRESSURE: 78 MMHG | SYSTOLIC BLOOD PRESSURE: 164 MMHG

## 2023-01-04 DIAGNOSIS — Z98.890 OTHER SPECIFIED POSTPROCEDURAL STATES: Chronic | ICD-10-CM

## 2023-01-04 DIAGNOSIS — D72.829 ELEVATED WHITE BLOOD CELL COUNT, UNSPECIFIED: ICD-10-CM

## 2023-01-04 DIAGNOSIS — Z00.00 ENCOUNTER FOR GENERAL ADULT MEDICAL EXAMINATION W/OUT ABNORMAL FINDINGS: ICD-10-CM

## 2023-01-04 DIAGNOSIS — L30.9 DERMATITIS, UNSPECIFIED: ICD-10-CM

## 2023-01-04 DIAGNOSIS — I25.10 ATHEROSCLEROTIC HEART DISEASE OF NATIVE CORONARY ARTERY W/OUT ANGINA PECTORIS: ICD-10-CM

## 2023-01-04 PROCEDURE — 99214 OFFICE O/P EST MOD 30 MIN: CPT | Mod: GC

## 2023-01-04 RX ORDER — LANCETS
EACH MISCELLANEOUS
Qty: 100 | Refills: 5 | Status: ACTIVE | COMMUNITY
Start: 2022-07-21 | End: 1900-01-01

## 2023-01-04 RX ORDER — METOPROLOL SUCCINATE 50 MG/1
50 TABLET, EXTENDED RELEASE ORAL DAILY
Qty: 30 | Refills: 5 | Status: ACTIVE | COMMUNITY
Start: 2018-07-05 | End: 1900-01-01

## 2023-01-04 RX ORDER — CLOPIDOGREL BISULFATE 75 MG/1
75 TABLET, FILM COATED ORAL
Qty: 30 | Refills: 3 | Status: ACTIVE | COMMUNITY
Start: 2017-09-06 | End: 1900-01-01

## 2023-01-04 RX ORDER — EZETIMIBE 10 MG/1
10 TABLET ORAL
Qty: 30 | Refills: 5 | Status: ACTIVE | COMMUNITY
Start: 2022-07-27 | End: 1900-01-01

## 2023-01-04 RX ORDER — ASPIRIN ENTERIC COATED TABLETS 81 MG 81 MG/1
81 TABLET, DELAYED RELEASE ORAL
Qty: 30 | Refills: 5 | Status: ACTIVE | COMMUNITY
Start: 2017-09-06 | End: 1900-01-01

## 2023-01-04 RX ORDER — HYDROCORTISONE 25 MG/G
2.5 CREAM TOPICAL TWICE DAILY
Qty: 1 | Refills: 0 | Status: ACTIVE | COMMUNITY
Start: 2023-01-04 | End: 1900-01-01

## 2023-01-04 RX ORDER — AMLODIPINE BESYLATE 10 MG/1
10 TABLET ORAL DAILY
Qty: 30 | Refills: 5 | Status: ACTIVE | COMMUNITY
Start: 1900-01-01 | End: 1900-01-01

## 2023-01-04 RX ORDER — LISINOPRIL 10 MG/1
10 TABLET ORAL DAILY
Qty: 1 | Refills: 6 | Status: DISCONTINUED | COMMUNITY
Start: 2022-10-26 | End: 2023-01-04

## 2023-01-04 RX ORDER — TAMSULOSIN HYDROCHLORIDE 0.4 MG/1
0.4 CAPSULE ORAL
Qty: 30 | Refills: 5 | Status: ACTIVE | COMMUNITY
Start: 2017-09-06 | End: 1900-01-01

## 2023-01-09 DIAGNOSIS — N18.30 CHRONIC KIDNEY DISEASE, STAGE 3 UNSPECIFIED: ICD-10-CM

## 2023-01-09 DIAGNOSIS — E11.9 TYPE 2 DIABETES MELLITUS WITHOUT COMPLICATIONS: ICD-10-CM

## 2023-01-09 DIAGNOSIS — L30.9 DERMATITIS, UNSPECIFIED: ICD-10-CM

## 2023-01-09 DIAGNOSIS — Z00.00 ENCOUNTER FOR GENERAL ADULT MEDICAL EXAMINATION WITHOUT ABNORMAL FINDINGS: ICD-10-CM

## 2023-01-09 DIAGNOSIS — I25.10 ATHEROSCLEROTIC HEART DISEASE OF NATIVE CORONARY ARTERY WITHOUT ANGINA PECTORIS: ICD-10-CM

## 2023-01-09 DIAGNOSIS — D72.829 ELEVATED WHITE BLOOD CELL COUNT, UNSPECIFIED: ICD-10-CM

## 2023-01-09 DIAGNOSIS — I10 ESSENTIAL (PRIMARY) HYPERTENSION: ICD-10-CM

## 2023-01-09 RX ORDER — INSULIN DEGLUDEC 200 U/ML
200 INJECTION, SOLUTION SUBCUTANEOUS 3 TIMES DAILY
Qty: 1 | Refills: 5 | Status: ACTIVE | COMMUNITY
Start: 2023-01-09 | End: 1900-01-01

## 2023-01-09 RX ORDER — INSULIN DEGLUDEC INJECTION 200 U/ML
200 INJECTION, SOLUTION SUBCUTANEOUS
Qty: 1 | Refills: 3 | Status: DISCONTINUED | COMMUNITY
Start: 2019-10-31 | End: 2023-01-09

## 2023-01-09 RX ORDER — DULAGLUTIDE 3 MG/.5ML
3 INJECTION, SOLUTION SUBCUTANEOUS
Qty: 2 | Refills: 5 | Status: ACTIVE | COMMUNITY
Start: 2019-10-31 | End: 1900-01-01

## 2023-01-09 RX ORDER — BLOOD SUGAR DIAGNOSTIC
STRIP MISCELLANEOUS EVERY 8 HOURS
Qty: 100 | Refills: 4 | Status: ACTIVE | COMMUNITY
Start: 2018-12-18 | End: 1900-01-01

## 2023-01-10 NOTE — ASSESSMENT
[FreeTextEntry1] : 72M w/ PMHx HTN, HLD, DM, CKD, HO MI (2017), and HO cataract surgery (12/2022) presents to clinic for follow up w. complaint of itching and dryness in b/l ankle/LE.\par \par #HO MI (2017)/ CAD s/p PCI s/p stenting\par #DLD, uncontrolled\par - lipid panel - severely Elevated\par - currently on ASA 81 and Plavix 75mg\par - currently Ezetimibe 10mg\par - tried Pravastatin and Atorvastatin in the past - did not tolerate\par - f/u w/ cardiology regarding Plavix and possibility of starting PCSK9 inhibitor\par - recommend diet and exercise\par \par #Uncontrolled T2DM\par - A1C improving 11.5 (7/2022) -> 10.2 (9/2022) -> 8.7 (9/2022)\par - c/w insulin 30u in morning and 20u at night\par - c/w trulicity\par - encourage diet modification and exercise\par - Patient may benefit from SGLPT2 inhibitor (f/u with endo)\par - Encourage endo f/u\par - following w/ endo\par - following w/ ophtho\par - referral for podiatry given\par \par #HTN\par - /78 - did not take medication this AM\par - HR 94\par - currently on Amlodipine 10mg and Tamsulosin 0.4mg\par - starting back on Metoprolol w/ increase dosage to 50mg daily\par \par #CKD III\par #Micro-proteinuria\par - creat 2.2, GFR 31\par - urine protein 100mg/dL\par - Nephrology referral given\par \par #Eczema\par - b/l LE rash/itching/dryness\par - hydrocortisone cream 2.5% prescribed\par \par #Leukocytosis\par - WBC 10.97\par - will repeat\par \par #s/p cataract surgery\par - s/p left cataract removal on 12/20/2022\par \par #H/o BPH\par - C/w Flomax\par \par #Pain of LT shoulder and arm, mild (resolved)\par - pt prn\par \par #LT knee pain (resolved)\par - PT prn \par \par #HCM\par - Flu vaccine administered 10/2022\par - RTC 3-4 months w/ routine labs

## 2023-01-10 NOTE — PHYSICAL EXAM
[Alert] : alert [No Acute Distress] : in no acute distress [Normal Appearance] : the appearance of the neck was normal [Supple] : the neck was supple [No Respiratory Distress] : no respiratory distress [No Acc Muscle Use] : no accessory muscle use [Respiration, Rhythm And Depth] : normal respiratory rhythm and effort [Auscultation Breath Sounds / Voice Sounds] : lungs were clear to auscultation bilaterally [Bowel Sounds] : normal bowel sounds [Abdomen Tenderness] : non-tender [Abdomen Soft] : soft [] : no hepato-splenomegaly [Oriented To Time, Place, And Person] : oriented to person, place, and time [Normal Affect] : the affect was normal [Normal Mood] : the mood was normal [Normal Insight/Judgment] : insight and judgment were not intact

## 2023-01-10 NOTE — HISTORY OF PRESENT ILLNESS
[0] : 2) Feeling down, depressed, or hopeless: Not at all (0) [PHQ-2 Negative - No further assessment needed] : PHQ-2 Negative - No further assessment needed [FreeTextEntry1] : 72M w/ PMHx HTN, HLD, DM, CKD, HO MI (2017), and HO cataract surgery (12/2022) presents to clinic for follow up w. complaint of itching and dryness in b/l ankle/LE. [[id number]] : 0

## 2023-01-10 NOTE — REVIEW OF SYSTEMS
[Skin Lesions] : skin lesion [Itching] : itching [Fever] : no fever [Chills] : no chills [Eye Pain] : no eye pain [Chest Pain] : no chest pain [Palpitations] : no palpitations [Shortness Of Breath] : no shortness of breath [Wheezing] : no wheezing [Cough] : no cough [SOB on Exertion] : no shortness of breath during exertion [Dysuria] : no dysuria [Incontinence] : no incontinence [Joint Swelling] : no joint swelling [Joint Stiffness] : no joint stiffness [Confused] : no confusion

## 2023-01-11 ENCOUNTER — EMERGENCY (EMERGENCY)
Facility: HOSPITAL | Age: 73
LOS: 0 days | Discharge: HOME | End: 2023-01-11
Attending: EMERGENCY MEDICINE | Admitting: EMERGENCY MEDICINE
Payer: MEDICARE

## 2023-01-11 VITALS
HEART RATE: 91 BPM | TEMPERATURE: 97 F | DIASTOLIC BLOOD PRESSURE: 88 MMHG | OXYGEN SATURATION: 97 % | HEIGHT: 65 IN | RESPIRATION RATE: 17 BRPM | SYSTOLIC BLOOD PRESSURE: 183 MMHG

## 2023-01-11 DIAGNOSIS — I50.9 HEART FAILURE, UNSPECIFIED: ICD-10-CM

## 2023-01-11 DIAGNOSIS — L03.011 CELLULITIS OF RIGHT FINGER: ICD-10-CM

## 2023-01-11 DIAGNOSIS — Z79.82 LONG TERM (CURRENT) USE OF ASPIRIN: ICD-10-CM

## 2023-01-11 DIAGNOSIS — N18.30 CHRONIC KIDNEY DISEASE, STAGE 3 UNSPECIFIED: ICD-10-CM

## 2023-01-11 DIAGNOSIS — E78.5 HYPERLIPIDEMIA, UNSPECIFIED: ICD-10-CM

## 2023-01-11 DIAGNOSIS — M10.9 GOUT, UNSPECIFIED: ICD-10-CM

## 2023-01-11 DIAGNOSIS — Z79.4 LONG TERM (CURRENT) USE OF INSULIN: ICD-10-CM

## 2023-01-11 DIAGNOSIS — I13.0 HYPERTENSIVE HEART AND CHRONIC KIDNEY DISEASE WITH HEART FAILURE AND STAGE 1 THROUGH STAGE 4 CHRONIC KIDNEY DISEASE, OR UNSPECIFIED CHRONIC KIDNEY DISEASE: ICD-10-CM

## 2023-01-11 DIAGNOSIS — I25.10 ATHEROSCLEROTIC HEART DISEASE OF NATIVE CORONARY ARTERY WITHOUT ANGINA PECTORIS: ICD-10-CM

## 2023-01-11 DIAGNOSIS — M79.644 PAIN IN RIGHT FINGER(S): ICD-10-CM

## 2023-01-11 DIAGNOSIS — Z98.890 OTHER SPECIFIED POSTPROCEDURAL STATES: Chronic | ICD-10-CM

## 2023-01-11 DIAGNOSIS — M79.89 OTHER SPECIFIED SOFT TISSUE DISORDERS: ICD-10-CM

## 2023-01-11 DIAGNOSIS — E11.22 TYPE 2 DIABETES MELLITUS WITH DIABETIC CHRONIC KIDNEY DISEASE: ICD-10-CM

## 2023-01-11 PROCEDURE — 10060 I&D ABSCESS SIMPLE/SINGLE: CPT

## 2023-01-11 PROCEDURE — 99284 EMERGENCY DEPT VISIT MOD MDM: CPT | Mod: FS,25

## 2023-01-11 NOTE — ED PROVIDER NOTE - CLINICAL SUMMARY MEDICAL DECISION MAKING FREE TEXT BOX
72-year-old man with h/o DM, HLD, HTN, CAD in ER with c/o pain and swelling to distal R thumb at nail border.  No drainage.  No F/C.  No hand or arm pain.  No N/V.  No weakness.  No CP/SOB.  No other complaints.  Denies any trauma to area.  PE - NAD, + R thumb paronychia  -I&D performed in ER, to DC patient on p.o. antibiotics.  Patient instructed on continued wound care at home.  Told to return to ER for increased pain, swelling, redness, wound drainage, fever, or any other new/concerning symptoms.  Patient understands and agrees with plan.  She will

## 2023-01-11 NOTE — ED PROVIDER NOTE - NSFOLLOWUPINSTRUCTIONS_ED_ALL_ED_FT
Paronychia    WHAT YOU NEED TO KNOW:    Paronychia is an infection of your nail fold caused by bacteria or a fungus. The nail fold is the skin around your nail. Paronychia may happen suddenly and last for 6 weeks or longer. You may have paronychia on more than 1 finger or toe.    DISCHARGE INSTRUCTIONS:    Medicines:     Td vaccine is a booster shot used to help prevent tetanus and diphtheria. The Td booster may be given to adolescents and adults every 10 years or for certain wounds and injuries.      Antibiotics: This medicine will help fight or prevent an infection. It may be given as a pill, cream, or ointment.      Steroids: This medicine will help decrease inflammation. It may be given as a pill, cream, or ointment.      Antifungal medicine: This medicine helps kill fungus that may be causing your infection. It may be given as a cream or ointment.      NSAIDs: These medicines decrease pain and swelling. NSAIDs are available without a doctor's order. Ask your healthcare provider which medicine is right for you. Ask how much to take and when to take it. Take as directed. NSAIDs can cause stomach bleeding and kidney problems if not taken correctly.      Take your medicine as directed. Contact your healthcare provider if you think your medicine is not helping or if you have side effects. Tell him of her if you are allergic to any medicine. Keep a list of the medicines, vitamins, and herbs you take. Include the amounts, and when and why you take them. Bring the list or the pill bottles to follow-up visits. Carry your medicine list with you in case of an emergency.    Follow up with your healthcare provider as directed: Write down your questions so you remember to ask them during your visits.     Self-care:     Soak your nail: Soak your nail in a mixture of equal parts vinegar and water 3 or 4 times each day. This will help decrease inflammation.      Apply a warm compress: Soak a washcloth in warm water and place it on your nail. This will help decrease inflammation.       Elevate: Raise your nail above the level of your heart as often as you can. This will help decrease swelling and pain. Prop your nail on pillows or blankets to keep it elevated comfortably.       Use lotion: Apply lotion after you wash your hands. This will prevent your skin from becoming too dry.     Prevent paronychia:     Avoid chemicals and allergens that may harm your skin and nails. This includes soaps, laundry detergents, and nail products.      Keep your nails clean and dry. Avoid soaking your nails in water. Use cotton-lined rubber gloves or wear 2 rubber gloves if you work with food or water. The gloves will help protect your nail folds.      Keep your nails short. Do not bite your nails, pick at your hangnails, suck your fingers, or wear fake nails. Bring your own nail tools when you go to the nail salon.    Contact your healthcare provider if:     Your nail becomes loose, deformed, or falls off.      You have a large abscess on your nail.      You have questions or concerns about your condition or care.    Return to the emergency department if:     You have severe nail pain.      The inflammation spreads to your hand or arm.

## 2023-01-11 NOTE — ED PROVIDER NOTE - OBJECTIVE STATEMENT
72-year-old male with history of diabetes, hypertension presents to the ED complaining of right thumb pain and swelling for 4 days.  No history of trauma, fever, chills or weakness.

## 2023-01-11 NOTE — ED PROVIDER NOTE - PATIENT PORTAL LINK FT
You can access the FollowMyHealth Patient Portal offered by [facility] by registering at the following website: [email or web address]. By joining [facility]’s FollowMyHealth portal, you will also be able to view your health information using other applications (apps) compatible with our system.

## 2023-01-11 NOTE — ED PROVIDER NOTE - NSICDXPASTSURGICALHX_GEN_ALL_CORE_FT
Sydni PAST SURGICAL HISTORY:  H/O foot surgery SCREWS IMPLANTED    History of cardiac cath STENT PLACEMENT X 1

## 2023-01-17 ENCOUNTER — OUTPATIENT (OUTPATIENT)
Dept: OUTPATIENT SERVICES | Facility: HOSPITAL | Age: 73
LOS: 1 days | Discharge: HOME | End: 2023-01-17

## 2023-01-17 ENCOUNTER — APPOINTMENT (OUTPATIENT)
Dept: NEPHROLOGY | Facility: CLINIC | Age: 73
End: 2023-01-17
Payer: MEDICAID

## 2023-01-17 VITALS
BODY MASS INDEX: 27.85 KG/M2 | TEMPERATURE: 96 F | HEART RATE: 82 BPM | HEIGHT: 69 IN | WEIGHT: 188 LBS | SYSTOLIC BLOOD PRESSURE: 163 MMHG | DIASTOLIC BLOOD PRESSURE: 89 MMHG | OXYGEN SATURATION: 99 %

## 2023-01-17 DIAGNOSIS — Z98.890 OTHER SPECIFIED POSTPROCEDURAL STATES: Chronic | ICD-10-CM

## 2023-01-17 DIAGNOSIS — N18.30 CHRONIC KIDNEY DISEASE, STAGE 3 UNSPECIFIED: ICD-10-CM

## 2023-01-17 DIAGNOSIS — I10 ESSENTIAL (PRIMARY) HYPERTENSION: ICD-10-CM

## 2023-01-17 DIAGNOSIS — E11.9 TYPE 2 DIABETES MELLITUS W/OUT COMPLICATIONS: ICD-10-CM

## 2023-01-17 PROCEDURE — 99204 OFFICE O/P NEW MOD 45 MIN: CPT | Mod: GC

## 2023-01-17 NOTE — PHYSICAL EXAM
[General Appearance - Alert] : alert [General Appearance - In No Acute Distress] : in no acute distress [Sclera] : the sclera and conjunctiva were normal [Extraocular Movements] : extraocular movements were intact [Hearing Threshold Finger Rub Not [place]] : hearing was normal [Examination Of The Oral Cavity] : the lips and gums were normal [Neck Appearance] : the appearance of the neck was normal [Exaggerated Use Of Accessory Muscles For Inspiration] : no accessory muscle use [Auscultation Breath Sounds / Voice Sounds] : lungs were clear to auscultation bilaterally [Heart Rate And Rhythm] : heart rate was normal and rhythm regular [Heart Sounds] : normal S1 and S2 [Edema] : there was no peripheral edema [Veins - Varicosity Changes] : there were no varicosital changes [Bowel Sounds] : normal bowel sounds [Abdomen Soft] : soft [Abdomen Tenderness] : non-tender [Abnormal Walk] : normal gait [Musculoskeletal - Swelling] : no joint swelling seen [Skin Color & Pigmentation] : normal skin color and pigmentation [] : no rash [Oriented To Time, Place, And Person] : oriented to person, place, and time [Affect] : the affect was normal [Mood] : the mood was normal

## 2023-01-17 NOTE — REVIEW OF SYSTEMS
[Eyesight Problems] : eyesight problems [Dysuria] : dysuria [Fever] : no fever [Chills] : no chills [Eye Pain] : no eye pain [Nasal Discharge] : no nasal discharge [Sore Throat] : no sore throat [Chest Pain] : no chest pain [Palpitations] : no palpitations [Cough] : no cough [SOB on Exertion] : no shortness of breath during exertion [Constipation] : no constipation [Diarrhea] : no diarrhea [Joint Swelling] : no joint swelling [Limb Swelling] : no limb swelling [Skin Lesions] : no skin lesions [Itching] : no itching [Confused] : no confusion [Dizziness] : no dizziness [Muscle Weakness] : no muscle weakness

## 2023-01-17 NOTE — END OF VISIT
[] : Resident [FreeTextEntry3] : Patient seen and examined with renal resident \par Agree with note A and plan as in cs\par # CKD 3b / HTN uncontrolled/ DM\par add ARB\par check repeat CMP UA and U protein creat \par follow BP\par RTC in 3 months

## 2023-01-17 NOTE — HISTORY OF PRESENT ILLNESS
[FreeTextEntry1] : 72M w/ PMHx HTN, HLD, DM, CKD, HO MI (2017), and HO cataract surgery (12/2022) presents to clinic to establish care for his CKD3b. Patient was following up with nephrologist in [place] but has moved to .  Patient is at his baseline Cr of 1.7. UA showed 100mg/dL proteinuria. Patient states his urine is foamy. Patient's blood pressure is not controlled. SBP around 150s at home getting as high as 200s.

## 2023-01-17 NOTE — ASSESSMENT
[FreeTextEntry1] : 72M w/ PMHx HTN, HLD, DM, CKD, CAD HO MI (2017), and HO cataract surgery (12/2022) presents to clinic to establish care for his CKD3b. Patient is at his baseline Cr of 1.7. UA showed 100mg/dL proteinuria. \par \par #CKD 3b \par - Cr 1.7, GFR 41 \par - UA pos for 100mg/dL proteinuria \par - start losartan 25 \par - obtain protein/cr, ua, renal US, repeat blood work in 2-3 months \par \par #HTN \par - /89\par - on amlodipine 10mg \par - start losartan 25mg qd \par \par #DM \par - a1c 8.7% 11/2022 \par - on trulicity 3mg qwk, insulin 60U tid

## 2023-01-24 ENCOUNTER — APPOINTMENT (OUTPATIENT)
Dept: CARDIOLOGY | Facility: CLINIC | Age: 73
End: 2023-01-24

## 2023-01-24 RX ORDER — LOSARTAN POTASSIUM 25 MG/1
25 TABLET, FILM COATED ORAL DAILY
Qty: 30 | Refills: 5 | Status: ACTIVE | COMMUNITY
Start: 2023-01-17 | End: 1900-01-01

## 2023-01-25 DIAGNOSIS — N18.30 CHRONIC KIDNEY DISEASE, STAGE 3 UNSPECIFIED: ICD-10-CM

## 2023-01-25 DIAGNOSIS — E11.9 TYPE 2 DIABETES MELLITUS WITHOUT COMPLICATIONS: ICD-10-CM

## 2023-01-25 DIAGNOSIS — I10 ESSENTIAL (PRIMARY) HYPERTENSION: ICD-10-CM

## 2023-02-01 ENCOUNTER — OUTPATIENT (OUTPATIENT)
Dept: OUTPATIENT SERVICES | Facility: HOSPITAL | Age: 73
LOS: 1 days | Discharge: HOME | End: 2023-02-01

## 2023-02-01 ENCOUNTER — APPOINTMENT (OUTPATIENT)
Dept: OPHTHALMOLOGY | Facility: CLINIC | Age: 73
End: 2023-02-01
Payer: MEDICAID

## 2023-02-01 DIAGNOSIS — Z98.890 OTHER SPECIFIED POSTPROCEDURAL STATES: Chronic | ICD-10-CM

## 2023-02-01 PROCEDURE — 92012 INTRM OPH EXAM EST PATIENT: CPT

## 2023-02-01 PROCEDURE — 92202 OPSCPY EXTND ON/MAC DRAW: CPT | Mod: RT

## 2023-03-15 ENCOUNTER — APPOINTMENT (OUTPATIENT)
Dept: OPHTHALMOLOGY | Facility: CLINIC | Age: 73
End: 2023-03-15

## 2023-03-15 ENCOUNTER — APPOINTMENT (OUTPATIENT)
Age: 73
End: 2023-03-15

## 2023-03-16 ENCOUNTER — APPOINTMENT (OUTPATIENT)
Dept: ENDOCRINOLOGY | Facility: CLINIC | Age: 73
End: 2023-03-16

## 2023-04-19 ENCOUNTER — APPOINTMENT (OUTPATIENT)
Dept: GERIATRICS | Facility: CLINIC | Age: 73
End: 2023-04-19

## 2023-05-23 ENCOUNTER — APPOINTMENT (OUTPATIENT)
Dept: NEPHROLOGY | Facility: CLINIC | Age: 73
End: 2023-05-23

## 2024-11-11 NOTE — ED ADULT NURSE NOTE - CHPI ED NUR QUALITY2
As we discussed, you will need to follow up with your PCP.  I would call and make appointment.  If you develop any thoughts of self harm or worsening symptoms go to ER.    aching

## 2025-06-03 NOTE — ED PROVIDER NOTE - CPE EDP [facility] NORM
Patient's hemorrhage is due to unknown likely trauma, this bleeding is associated with an antiplatelet agent, the antiplatelet agent is Select Antiplatelet Agent(s): Aspirin and Clopidogrel. Patients most recent Hgb, Hct, platelets, and INR are listed below.  Recent Labs     06/01/25  0523 06/02/25  0344 06/03/25  0404   HGB 11.0* 10.2* 10.2*   HCT 35.0* 31.7* 32.9*    248 269     Plan  - Will trend hemoglobin/hematocrit Daily  - Will monitor and correct any coagulation defects  - Will transfuse if Hgb is <7g/dl (<8g/dl in cases of active ACS) or if patient has rapid bleeding leading to hemodynamic instability  - consulted transfer center for recommendations.  The patient may require neurosurgery evaluation.  For now will hold antiplatelets and anticoagulants.  Family has been updated.   - - -
